# Patient Record
Sex: FEMALE | Race: BLACK OR AFRICAN AMERICAN | Employment: OTHER | URBAN - METROPOLITAN AREA
[De-identification: names, ages, dates, MRNs, and addresses within clinical notes are randomized per-mention and may not be internally consistent; named-entity substitution may affect disease eponyms.]

---

## 2018-07-04 ENCOUNTER — APPOINTMENT (OUTPATIENT)
Dept: GENERAL RADIOLOGY | Age: 73
End: 2018-07-04
Payer: MEDICARE

## 2018-07-04 ENCOUNTER — HOSPITAL ENCOUNTER (EMERGENCY)
Age: 73
Discharge: HOME | End: 2018-07-04
Attending: EMERGENCY MEDICINE
Payer: MEDICARE

## 2018-07-04 VITALS
HEART RATE: 62 BPM | BODY MASS INDEX: 37.32 KG/M2 | TEMPERATURE: 98.3 F | HEIGHT: 64 IN | RESPIRATION RATE: 16 BRPM | OXYGEN SATURATION: 97 % | DIASTOLIC BLOOD PRESSURE: 52 MMHG | WEIGHT: 218.6 LBS | SYSTOLIC BLOOD PRESSURE: 132 MMHG

## 2018-07-04 DIAGNOSIS — M10.00 ACUTE IDIOPATHIC GOUT, UNSPECIFIED SITE: Primary | ICD-10-CM

## 2018-07-04 LAB
ABSOLUTE EOS #: 0.3 K/UL (ref 0–0.4)
ABSOLUTE IMMATURE GRANULOCYTE: ABNORMAL K/UL (ref 0–0.3)
ABSOLUTE LYMPH #: 1 K/UL (ref 1–4.8)
ABSOLUTE MONO #: 0.5 K/UL (ref 0.2–0.8)
BASOPHILS # BLD: 0 % (ref 0–2)
BASOPHILS ABSOLUTE: 0 K/UL (ref 0–0.2)
DIFFERENTIAL TYPE: ABNORMAL
EOSINOPHILS RELATIVE PERCENT: 5 % (ref 1–4)
HCT VFR BLD CALC: 28.8 % (ref 36–46)
HEMOGLOBIN: 9 G/DL (ref 12–16)
IMMATURE GRANULOCYTES: ABNORMAL %
LYMPHOCYTES # BLD: 16 % (ref 24–44)
MCH RBC QN AUTO: 26.5 PG (ref 26–34)
MCHC RBC AUTO-ENTMCNC: 31.1 G/DL (ref 31–37)
MCV RBC AUTO: 85.2 FL (ref 80–100)
MONOCYTES # BLD: 9 % (ref 1–7)
NRBC AUTOMATED: ABNORMAL PER 100 WBC
PDW BLD-RTO: 17.2 % (ref 11.5–14.5)
PLATELET # BLD: 242 K/UL (ref 130–400)
PLATELET ESTIMATE: ABNORMAL
PMV BLD AUTO: 9.1 FL (ref 6–12)
RBC # BLD: 3.38 M/UL (ref 4–5.2)
RBC # BLD: ABNORMAL 10*6/UL
SEG NEUTROPHILS: 70 % (ref 36–66)
SEGMENTED NEUTROPHILS ABSOLUTE COUNT: 4.5 K/UL (ref 1.8–7.7)
URIC ACID: 6.4 MG/DL (ref 2.4–5.7)
WBC # BLD: 6.3 K/UL (ref 3.5–11)
WBC # BLD: ABNORMAL 10*3/UL

## 2018-07-04 PROCEDURE — 84550 ASSAY OF BLOOD/URIC ACID: CPT

## 2018-07-04 PROCEDURE — 73130 X-RAY EXAM OF HAND: CPT

## 2018-07-04 PROCEDURE — 73110 X-RAY EXAM OF WRIST: CPT

## 2018-07-04 PROCEDURE — 6360000002 HC RX W HCPCS: Performed by: EMERGENCY MEDICINE

## 2018-07-04 PROCEDURE — 85025 COMPLETE CBC W/AUTO DIFF WBC: CPT

## 2018-07-04 PROCEDURE — 96372 THER/PROPH/DIAG INJ SC/IM: CPT

## 2018-07-04 PROCEDURE — 99283 EMERGENCY DEPT VISIT LOW MDM: CPT

## 2018-07-04 RX ORDER — CARVEDILOL 25 MG/1
25 TABLET ORAL 2 TIMES DAILY WITH MEALS
COMMUNITY

## 2018-07-04 RX ORDER — FEXOFENADINE HCL 180 MG/1
180 TABLET ORAL DAILY
Status: ON HOLD | COMMUNITY
End: 2020-12-29 | Stop reason: ALTCHOICE

## 2018-07-04 RX ORDER — POTASSIUM CHLORIDE 20 MEQ/1
20 TABLET, EXTENDED RELEASE ORAL DAILY
COMMUNITY

## 2018-07-04 RX ORDER — FLUTICASONE PROPIONATE 50 MCG
1 SPRAY, SUSPENSION (ML) NASAL 2 TIMES DAILY
COMMUNITY

## 2018-07-04 RX ORDER — COLCHICINE 0.6 MG/1
TABLET ORAL
Qty: 10 TABLET | Refills: 0 | Status: SHIPPED | OUTPATIENT
Start: 2018-07-04 | End: 2020-12-28

## 2018-07-04 RX ORDER — ISOSORBIDE DINITRATE 20 MG/1
20 TABLET ORAL 3 TIMES DAILY
COMMUNITY

## 2018-07-04 RX ORDER — AMLODIPINE BESYLATE 10 MG/1
10 TABLET ORAL DAILY
COMMUNITY
Start: 2018-05-11

## 2018-07-04 RX ORDER — HYDRALAZINE HYDROCHLORIDE 50 MG/1
50 TABLET, FILM COATED ORAL 3 TIMES DAILY
COMMUNITY
Start: 2017-09-01

## 2018-07-04 RX ORDER — ATORVASTATIN CALCIUM 10 MG/1
10 TABLET, FILM COATED ORAL NIGHTLY
Status: ON HOLD | COMMUNITY
End: 2020-12-29

## 2018-07-04 RX ORDER — GUAIFENESIN 600 MG/1
1200 TABLET, EXTENDED RELEASE ORAL PRN
Status: ON HOLD | COMMUNITY
End: 2020-12-29

## 2018-07-04 RX ORDER — DEXAMETHASONE SODIUM PHOSPHATE 4 MG/ML
4 INJECTION, SOLUTION INTRA-ARTICULAR; INTRALESIONAL; INTRAMUSCULAR; INTRAVENOUS; SOFT TISSUE ONCE
Status: COMPLETED | OUTPATIENT
Start: 2018-07-04 | End: 2018-07-04

## 2018-07-04 RX ORDER — PROBENECID 500 MG/1
500 TABLET, FILM COATED ORAL 2 TIMES DAILY
COMMUNITY

## 2018-07-04 RX ORDER — FERROUS SULFATE 325(65) MG
325 TABLET ORAL 2 TIMES DAILY WITH MEALS
COMMUNITY
Start: 2018-01-25

## 2018-07-04 RX ORDER — ALBUTEROL SULFATE 90 UG/1
2 AEROSOL, METERED RESPIRATORY (INHALATION) EVERY 4 HOURS PRN
COMMUNITY

## 2018-07-04 RX ORDER — OMEPRAZOLE 20 MG/1
40 TABLET, DELAYED RELEASE ORAL 2 TIMES DAILY
Status: ON HOLD | COMMUNITY
End: 2020-12-29 | Stop reason: ALTCHOICE

## 2018-07-04 RX ORDER — LANOLIN ALCOHOL/MO/W.PET/CERES
400 CREAM (GRAM) TOPICAL DAILY
COMMUNITY
Start: 2018-01-21

## 2018-07-04 RX ORDER — FOLIC ACID 1 MG/1
1 TABLET ORAL DAILY
COMMUNITY

## 2018-07-04 RX ORDER — FUROSEMIDE 40 MG/1
40 TABLET ORAL DAILY
Status: ON HOLD | COMMUNITY
End: 2020-12-29 | Stop reason: ALTCHOICE

## 2018-07-04 RX ORDER — MEXILETINE HYDROCHLORIDE 150 MG/1
150 CAPSULE ORAL 3 TIMES DAILY
COMMUNITY
Start: 2017-10-04

## 2018-07-04 RX ORDER — ALENDRONATE SODIUM 70 MG/1
70 TABLET ORAL
Status: ON HOLD | COMMUNITY
End: 2020-12-29

## 2018-07-04 RX ORDER — KETOROLAC TROMETHAMINE 15 MG/ML
15 INJECTION, SOLUTION INTRAMUSCULAR; INTRAVENOUS ONCE
Status: COMPLETED | OUTPATIENT
Start: 2018-07-04 | End: 2018-07-04

## 2018-07-04 RX ORDER — CARVEDILOL 25 MG/1
25 TABLET ORAL 2 TIMES DAILY
Status: ON HOLD | COMMUNITY
End: 2020-12-29 | Stop reason: SDUPTHER

## 2018-07-04 RX ADMIN — KETOROLAC TROMETHAMINE 15 MG: 15 INJECTION, SOLUTION INTRAMUSCULAR; INTRAVENOUS at 20:43

## 2018-07-04 RX ADMIN — DEXAMETHASONE SODIUM PHOSPHATE 4 MG: 4 INJECTION, SOLUTION INTRAMUSCULAR; INTRAVENOUS at 21:20

## 2018-07-04 ASSESSMENT — PAIN SCALES - GENERAL
PAINLEVEL_OUTOF10: 9
PAINLEVEL_OUTOF10: 8
PAINLEVEL_OUTOF10: 9

## 2018-07-04 ASSESSMENT — PAIN DESCRIPTION - ORIENTATION: ORIENTATION: LEFT

## 2018-07-04 ASSESSMENT — PAIN DESCRIPTION - LOCATION: LOCATION: HAND

## 2018-07-04 ASSESSMENT — PAIN DESCRIPTION - FREQUENCY: FREQUENCY: CONTINUOUS

## 2018-07-04 ASSESSMENT — ENCOUNTER SYMPTOMS
RESPIRATORY NEGATIVE: 1
EYES NEGATIVE: 1
ALLERGIC/IMMUNOLOGIC NEGATIVE: 1

## 2018-07-04 ASSESSMENT — PAIN DESCRIPTION - PAIN TYPE: TYPE: ACUTE PAIN

## 2018-07-05 NOTE — ED PROVIDER NOTES
Ultrasound and MRI are read by the radiologist. Plain radiographic images are visualized and preliminarily interpreted by the emergency physician with the below findings:  Xr Wrist Left (min 3 Views)    Result Date: 7/5/2018  EXAMINATION: FOUR XRAY VIEWS OF THE LEFT WRIST; 3 XRAY VIEWS OF THE LEFT HAND 7/4/2018 8:41 pm COMPARISON: Left wrist dated 01/08/2006. HISTORY: ORDERING SYSTEM PROVIDED HISTORY: Pain TECHNOLOGIST PROVIDED HISTORY: Reason for exam:->Pain Ordering Physician Provided Reason for Exam: Lt wrist pain, NKI Acuity: Acute Type of Exam: Initial FINDINGS: Left wrist: The bones are demineralized. There is advanced radiocarpal joint space narrowing, progressed since the prior exam.  Scapholunate widening is present with proximal migration of the capitate. There is mature ossification at the radial styloid. Chondrocalcinosis is noted. There is mild distal radioulnar joint degenerative change. Marginal osteophytes are noted at the triscaphe and 1st carpometacarpal articulation. An acute osseous abnormality is not appreciated. Left hand: There is no evidence of acute fracture or dislocation. Small mature ossifications are noted at the 2nd MCP joint. Dorsal soft tissue swelling is noted. 1. Osteopenia. No displaced fracture. 2. Dorsal soft tissue swelling. 3. Left SLAC wrist deformity with advanced radiocarpal joint space narrowing, significantly progressed since the prior exam. 4. Mild to moderate degenerative change at the thumb base. Xr Hand Left (min 3 Views)    Result Date: 7/5/2018  EXAMINATION: FOUR XRAY VIEWS OF THE LEFT WRIST; 3 XRAY VIEWS OF THE LEFT HAND 7/4/2018 8:41 pm COMPARISON: Left wrist dated 01/08/2006. HISTORY: ORDERING SYSTEM PROVIDED HISTORY: Pain TECHNOLOGIST PROVIDED HISTORY: Reason for exam:->Pain Ordering Physician Provided Reason for Exam: Lt wrist pain, NKI Acuity: Acute Type of Exam: Initial FINDINGS: Left wrist: The bones are demineralized.   There is advanced radiocarpal joint space narrowing, progressed since the prior exam.  Scapholunate widening is present with proximal migration of the capitate. There is mature ossification at the radial styloid. Chondrocalcinosis is noted. There is mild distal radioulnar joint degenerative change. Marginal osteophytes are noted at the triscaphe and 1st carpometacarpal articulation. An acute osseous abnormality is not appreciated. Left hand: There is no evidence of acute fracture or dislocation. Small mature ossifications are noted at the 2nd MCP joint. Dorsal soft tissue swelling is noted. 1. Osteopenia. No displaced fracture. 2. Dorsal soft tissue swelling. 3. Left SLAC wrist deformity with advanced radiocarpal joint space narrowing, significantly progressed since the prior exam. 4. Mild to moderate degenerative change at the thumb base. (Please note that portions of this note were completed with a voice recognition program.  Efforts were made to edit the dictations but occasionally words are mis-transcribed.)    Samir Jain MD  Attending Emergency Physician            ED BEDSIDE ULTRASOUND:   Performed by ED Physician - none    LABS:  Labs Reviewed   CBC WITH AUTO DIFFERENTIAL - Abnormal; Notable for the following:        Result Value    RBC 3.38 (*)     Hemoglobin 9.0 (*)     Hematocrit 28.8 (*)     RDW 17.2 (*)     Seg Neutrophils 70 (*)     Lymphocytes 16 (*)     Monocytes 9 (*)     Eosinophils % 5 (*)     All other components within normal limits   URIC ACID - Abnormal; Notable for the following:     Uric Acid 6.4 (*)     All other components within normal limits       All other labs were within normal range or not returned as of this dictation.     EMERGENCY DEPARTMENT COURSE and DIFFERENTIAL DIAGNOSIS/MDM:   Vitals:    Vitals:    07/04/18 1925   BP: (!) 132/52   Pulse: 62   Resp: 16   Temp: 98.3 °F (36.8 °C)   TempSrc: Oral   SpO2: 97%   Weight: 218 lb 9.6 oz (99.2 kg)   Height: 5' 4\" (1.626 m)

## 2018-07-05 NOTE — ED NOTES
Pt presents to the ed via private auto c/o left hand pain that started about 3 days ago after doing yard work. Pt denies fall or injury, skin is warm to touch, she denies any numbness or tingling, radial pulses equal and strong, cap refill brisk, edema noted to left hand.      Alyssa Moore RN  07/04/18 2007

## 2019-09-07 ENCOUNTER — HOSPITAL ENCOUNTER (OUTPATIENT)
Dept: ONCOLOGY | Age: 74
Discharge: HOME | End: 2019-09-07
Attending: INTERNAL MEDICINE | Admitting: INTERNAL MEDICINE
Payer: MEDICARE

## 2019-09-07 VITALS
TEMPERATURE: 97 F | RESPIRATION RATE: 18 BRPM | HEART RATE: 65 BPM | DIASTOLIC BLOOD PRESSURE: 66 MMHG | SYSTOLIC BLOOD PRESSURE: 158 MMHG | OXYGEN SATURATION: 94 %

## 2019-09-07 PROBLEM — D64.9 ANEMIA: Status: ACTIVE | Noted: 2019-09-07

## 2019-09-07 LAB — BLOOD BANK SPECIMEN: NORMAL

## 2019-09-07 PROCEDURE — 6360000002 HC RX W HCPCS: Performed by: INTERNAL MEDICINE

## 2019-09-07 PROCEDURE — 86850 RBC ANTIBODY SCREEN: CPT

## 2019-09-07 PROCEDURE — 96374 THER/PROPH/DIAG INJ IV PUSH: CPT

## 2019-09-07 PROCEDURE — 86901 BLOOD TYPING SEROLOGIC RH(D): CPT

## 2019-09-07 PROCEDURE — 6370000000 HC RX 637 (ALT 250 FOR IP): Performed by: INTERNAL MEDICINE

## 2019-09-07 PROCEDURE — 86900 BLOOD TYPING SEROLOGIC ABO: CPT

## 2019-09-07 PROCEDURE — 36430 TRANSFUSION BLD/BLD COMPNT: CPT

## 2019-09-07 PROCEDURE — P9016 RBC LEUKOCYTES REDUCED: HCPCS

## 2019-09-07 PROCEDURE — 86920 COMPATIBILITY TEST SPIN: CPT

## 2019-09-07 RX ORDER — ACETAMINOPHEN 325 MG/1
650 TABLET ORAL ONCE
Status: COMPLETED | OUTPATIENT
Start: 2019-09-07 | End: 2019-09-07

## 2019-09-07 RX ORDER — FUROSEMIDE 10 MG/ML
20 INJECTION INTRAMUSCULAR; INTRAVENOUS ONCE
Status: COMPLETED | OUTPATIENT
Start: 2019-09-07 | End: 2019-09-07

## 2019-09-07 RX ORDER — DIPHENHYDRAMINE HCL 25 MG
25 TABLET ORAL ONCE
Status: COMPLETED | OUTPATIENT
Start: 2019-09-07 | End: 2019-09-07

## 2019-09-07 RX ADMIN — DIPHENHYDRAMINE HCL 25 MG: 25 TABLET ORAL at 09:36

## 2019-09-07 RX ADMIN — ACETAMINOPHEN 650 MG: 325 TABLET ORAL at 09:36

## 2019-09-07 RX ADMIN — FUROSEMIDE 20 MG: 10 INJECTION, SOLUTION INTRAMUSCULAR; INTRAVENOUS at 12:12

## 2019-09-07 ASSESSMENT — PAIN SCALES - GENERAL: PAINLEVEL_OUTOF10: 0

## 2019-09-08 LAB
ABO/RH: NORMAL
ANTIBODY SCREEN: NEGATIVE
ARM BAND NUMBER: NORMAL
BLD PROD TYP BPU: NORMAL
BLD PROD TYP BPU: NORMAL
CROSSMATCH RESULT: NORMAL
CROSSMATCH RESULT: NORMAL
DISPENSE STATUS BLOOD BANK: NORMAL
DISPENSE STATUS BLOOD BANK: NORMAL
EXPIRATION DATE: NORMAL
TRANSFUSION STATUS: NORMAL
TRANSFUSION STATUS: NORMAL
UNIT DIVISION: 0
UNIT DIVISION: 0
UNIT NUMBER: NORMAL
UNIT NUMBER: NORMAL

## 2019-11-19 ENCOUNTER — HOSPITAL ENCOUNTER (OUTPATIENT)
Dept: ONCOLOGY | Age: 74
Discharge: HOME | End: 2019-11-19
Attending: INTERNAL MEDICINE | Admitting: INTERNAL MEDICINE
Payer: MEDICARE

## 2019-11-19 VITALS
RESPIRATION RATE: 17 BRPM | OXYGEN SATURATION: 98 % | HEART RATE: 60 BPM | TEMPERATURE: 97.3 F | DIASTOLIC BLOOD PRESSURE: 71 MMHG | SYSTOLIC BLOOD PRESSURE: 184 MMHG

## 2019-11-19 DIAGNOSIS — D50.8 OTHER IRON DEFICIENCY ANEMIA: ICD-10-CM

## 2019-11-19 LAB — BLOOD BANK SPECIMEN: NORMAL

## 2019-11-19 PROCEDURE — 6370000000 HC RX 637 (ALT 250 FOR IP): Performed by: INTERNAL MEDICINE

## 2019-11-19 PROCEDURE — 36430 TRANSFUSION BLD/BLD COMPNT: CPT

## 2019-11-19 PROCEDURE — P9016 RBC LEUKOCYTES REDUCED: HCPCS

## 2019-11-19 PROCEDURE — 86900 BLOOD TYPING SEROLOGIC ABO: CPT

## 2019-11-19 PROCEDURE — 86901 BLOOD TYPING SEROLOGIC RH(D): CPT

## 2019-11-19 PROCEDURE — 86850 RBC ANTIBODY SCREEN: CPT

## 2019-11-19 PROCEDURE — 86920 COMPATIBILITY TEST SPIN: CPT

## 2019-11-19 RX ORDER — DIPHENHYDRAMINE HCL 25 MG
25 TABLET ORAL ONCE
Status: COMPLETED | OUTPATIENT
Start: 2019-11-19 | End: 2019-11-19

## 2019-11-19 RX ORDER — FUROSEMIDE 10 MG/ML
20 INJECTION INTRAMUSCULAR; INTRAVENOUS ONCE
Status: DISCONTINUED | OUTPATIENT
Start: 2019-11-19 | End: 2019-11-19 | Stop reason: HOSPADM

## 2019-11-19 RX ORDER — ACETAMINOPHEN 325 MG/1
650 TABLET ORAL ONCE
Status: COMPLETED | OUTPATIENT
Start: 2019-11-19 | End: 2019-11-19

## 2019-11-19 RX ADMIN — ACETAMINOPHEN 650 MG: 325 TABLET ORAL at 14:51

## 2019-11-19 RX ADMIN — DIPHENHYDRAMINE HCL 25 MG: 25 TABLET ORAL at 14:52

## 2019-11-19 ASSESSMENT — PAIN SCALES - GENERAL: PAINLEVEL_OUTOF10: 0

## 2020-05-27 ENCOUNTER — HOSPITAL ENCOUNTER (OUTPATIENT)
Dept: ONCOLOGY | Age: 75
Discharge: HOME | End: 2020-05-27
Attending: INTERNAL MEDICINE | Admitting: INTERNAL MEDICINE
Payer: MEDICARE

## 2020-05-27 ENCOUNTER — HOSPITAL ENCOUNTER (OUTPATIENT)
Age: 75
Discharge: HOME | End: 2020-05-27
Payer: MEDICARE

## 2020-05-27 VITALS
SYSTOLIC BLOOD PRESSURE: 136 MMHG | DIASTOLIC BLOOD PRESSURE: 56 MMHG | OXYGEN SATURATION: 94 % | TEMPERATURE: 98.4 F | RESPIRATION RATE: 18 BRPM | HEART RATE: 60 BPM

## 2020-05-27 LAB
ABSOLUTE EOS #: 0.17 K/UL (ref 0–0.4)
ABSOLUTE IMMATURE GRANULOCYTE: 0 K/UL (ref 0–0.3)
ABSOLUTE LYMPH #: 0.61 K/UL (ref 1–4.8)
ABSOLUTE MONO #: 0.33 K/UL (ref 0.1–0.8)
ABSOLUTE RETIC #: 0.06 M/UL (ref 0.03–0.08)
ALBUMIN SERPL-MCNC: 3.1 G/DL (ref 3.5–5.2)
ALBUMIN/GLOBULIN RATIO: 1 (ref 1–2.5)
ALP BLD-CCNC: 52 U/L (ref 35–104)
ALT SERPL-CCNC: 10 U/L (ref 5–33)
ANION GAP SERPL CALCULATED.3IONS-SCNC: 13 MMOL/L (ref 9–17)
AST SERPL-CCNC: 15 U/L
BASOPHILS # BLD: 0 % (ref 0–2)
BASOPHILS ABSOLUTE: 0 K/UL (ref 0–0.2)
BILIRUB SERPL-MCNC: <0.1 MG/DL (ref 0.3–1.2)
BUN BLDV-MCNC: 9 MG/DL (ref 8–23)
BUN/CREAT BLD: ABNORMAL (ref 9–20)
CALCIUM SERPL-MCNC: 8.8 MG/DL (ref 8.6–10.4)
CHLORIDE BLD-SCNC: 110 MMOL/L (ref 98–107)
CO2: 22 MMOL/L (ref 20–31)
CREAT SERPL-MCNC: 1.08 MG/DL (ref 0.5–0.9)
DIFFERENTIAL TYPE: ABNORMAL
EOSINOPHILS RELATIVE PERCENT: 3 % (ref 1–4)
FERRITIN: 47 UG/L (ref 13–150)
FOLATE: >20 NG/ML
GFR AFRICAN AMERICAN: 60 ML/MIN
GFR NON-AFRICAN AMERICAN: 49 ML/MIN
GFR SERPL CREATININE-BSD FRML MDRD: ABNORMAL ML/MIN/{1.73_M2}
GFR SERPL CREATININE-BSD FRML MDRD: ABNORMAL ML/MIN/{1.73_M2}
GLUCOSE BLD-MCNC: 179 MG/DL (ref 70–99)
HAPTOGLOBIN: 154 MG/DL (ref 30–200)
HCT VFR BLD CALC: 24 % (ref 36.3–47.1)
HEMOGLOBIN: 6.8 G/DL (ref 11.9–15.1)
IMMATURE GRANULOCYTES: 0 %
IMMATURE RETIC FRACT: 24.9 % (ref 2.7–18.3)
IRON SATURATION: 33 % (ref 20–55)
IRON: 81 UG/DL (ref 37–145)
LACTATE DEHYDROGENASE: 229 U/L (ref 135–214)
LYMPHOCYTES # BLD: 11 % (ref 24–44)
MCH RBC QN AUTO: 24.9 PG (ref 25.2–33.5)
MCHC RBC AUTO-ENTMCNC: 28.3 G/DL (ref 28.4–34.8)
MCV RBC AUTO: 87.9 FL (ref 82.6–102.9)
MONOCYTES # BLD: 6 % (ref 1–7)
MORPHOLOGY: ABNORMAL
NRBC AUTOMATED: 0 PER 100 WBC
PDW BLD-RTO: 21.2 % (ref 11.8–14.4)
PLATELET # BLD: 371 K/UL (ref 138–453)
PLATELET ESTIMATE: ABNORMAL
PMV BLD AUTO: 11.4 FL (ref 8.1–13.5)
POTASSIUM SERPL-SCNC: 4.1 MMOL/L (ref 3.7–5.3)
RBC # BLD: 2.73 M/UL (ref 3.95–5.11)
RBC # BLD: ABNORMAL 10*6/UL
RETIC %: 2.3 % (ref 0.5–1.9)
RETIC HEMOGLOBIN: 23.3 PG (ref 28.2–35.7)
SEDIMENTATION RATE, ERYTHROCYTE: 27 MM (ref 0–20)
SEG NEUTROPHILS: 80 % (ref 36–66)
SEGMENTED NEUTROPHILS ABSOLUTE COUNT: 4.39 K/UL (ref 1.8–7.7)
SODIUM BLD-SCNC: 145 MMOL/L (ref 135–144)
TOTAL IRON BINDING CAPACITY: 243 UG/DL (ref 250–450)
TOTAL PROTEIN: 6.2 G/DL (ref 6.4–8.3)
TSH SERPL DL<=0.05 MIU/L-ACNC: 0.69 MIU/L (ref 0.3–5)
UNSATURATED IRON BINDING CAPACITY: 162 UG/DL (ref 112–347)
VITAMIN B-12: 956 PG/ML (ref 232–1245)
WBC # BLD: 5.5 K/UL (ref 3.5–11.3)
WBC # BLD: ABNORMAL 10*3/UL

## 2020-05-27 PROCEDURE — 82746 ASSAY OF FOLIC ACID SERUM: CPT

## 2020-05-27 PROCEDURE — 86901 BLOOD TYPING SEROLOGIC RH(D): CPT

## 2020-05-27 PROCEDURE — 86038 ANTINUCLEAR ANTIBODIES: CPT

## 2020-05-27 PROCEDURE — 84155 ASSAY OF PROTEIN SERUM: CPT

## 2020-05-27 PROCEDURE — 82668 ASSAY OF ERYTHROPOIETIN: CPT

## 2020-05-27 PROCEDURE — 83540 ASSAY OF IRON: CPT

## 2020-05-27 PROCEDURE — 80053 COMPREHEN METABOLIC PANEL: CPT

## 2020-05-27 PROCEDURE — 36430 TRANSFUSION BLD/BLD COMPNT: CPT

## 2020-05-27 PROCEDURE — 82607 VITAMIN B-12: CPT

## 2020-05-27 PROCEDURE — 86900 BLOOD TYPING SEROLOGIC ABO: CPT

## 2020-05-27 PROCEDURE — 83010 ASSAY OF HAPTOGLOBIN QUANT: CPT

## 2020-05-27 PROCEDURE — 85025 COMPLETE CBC W/AUTO DIFF WBC: CPT

## 2020-05-27 PROCEDURE — 6370000000 HC RX 637 (ALT 250 FOR IP): Performed by: INTERNAL MEDICINE

## 2020-05-27 PROCEDURE — 86334 IMMUNOFIX E-PHORESIS SERUM: CPT

## 2020-05-27 PROCEDURE — 84165 PROTEIN E-PHORESIS SERUM: CPT

## 2020-05-27 PROCEDURE — P9040 RBC LEUKOREDUCED IRRADIATED: HCPCS

## 2020-05-27 PROCEDURE — 85651 RBC SED RATE NONAUTOMATED: CPT

## 2020-05-27 PROCEDURE — 83550 IRON BINDING TEST: CPT

## 2020-05-27 PROCEDURE — 86920 COMPATIBILITY TEST SPIN: CPT

## 2020-05-27 PROCEDURE — 82728 ASSAY OF FERRITIN: CPT

## 2020-05-27 PROCEDURE — 86850 RBC ANTIBODY SCREEN: CPT

## 2020-05-27 PROCEDURE — 36415 COLL VENOUS BLD VENIPUNCTURE: CPT

## 2020-05-27 PROCEDURE — 85045 AUTOMATED RETICULOCYTE COUNT: CPT

## 2020-05-27 PROCEDURE — 83615 LACTATE (LD) (LDH) ENZYME: CPT

## 2020-05-27 PROCEDURE — 84443 ASSAY THYROID STIM HORMONE: CPT

## 2020-05-27 PROCEDURE — P9016 RBC LEUKOCYTES REDUCED: HCPCS

## 2020-05-27 PROCEDURE — 76937 US GUIDE VASCULAR ACCESS: CPT

## 2020-05-27 RX ORDER — ACETAMINOPHEN 325 MG/1
650 TABLET ORAL ONCE
Status: COMPLETED | OUTPATIENT
Start: 2020-05-27 | End: 2020-05-27

## 2020-05-27 RX ORDER — DIPHENHYDRAMINE HCL 25 MG
25 TABLET ORAL ONCE
Status: COMPLETED | OUTPATIENT
Start: 2020-05-27 | End: 2020-05-27

## 2020-05-27 RX ORDER — SODIUM CHLORIDE 0.9 % (FLUSH) 0.9 %
10 SYRINGE (ML) INJECTION PRN
Status: DISCONTINUED | OUTPATIENT
Start: 2020-05-27 | End: 2020-05-27 | Stop reason: HOSPADM

## 2020-05-27 RX ORDER — FUROSEMIDE 10 MG/ML
20 INJECTION INTRAMUSCULAR; INTRAVENOUS ONCE
Status: DISCONTINUED | OUTPATIENT
Start: 2020-05-27 | End: 2020-05-27 | Stop reason: HOSPADM

## 2020-05-27 RX ADMIN — ACETAMINOPHEN 650 MG: 325 TABLET ORAL at 14:23

## 2020-05-27 RX ADMIN — DIPHENHYDRAMINE HCL 25 MG: 25 TABLET ORAL at 14:23

## 2020-05-27 ASSESSMENT — PAIN SCALES - GENERAL: PAINLEVEL_OUTOF10: 0

## 2020-05-28 LAB
ABO/RH: NORMAL
ANTI-NUCLEAR ANTIBODY (ANA): NEGATIVE
ANTIBODY SCREEN: NEGATIVE
ARM BAND NUMBER: NORMAL
BLD PROD TYP BPU: NORMAL
BLD PROD TYP BPU: NORMAL
CROSSMATCH RESULT: NORMAL
CROSSMATCH RESULT: NORMAL
DISPENSE STATUS BLOOD BANK: NORMAL
DISPENSE STATUS BLOOD BANK: NORMAL
ERYTHROPOIETIN: 240 MU/ML (ref 4–27)
EXPIRATION DATE: NORMAL
SURGICAL PATHOLOGY REPORT: NORMAL
TRANSFUSION STATUS: NORMAL
TRANSFUSION STATUS: NORMAL
UNIT DIVISION: 0
UNIT DIVISION: 0
UNIT NUMBER: NORMAL
UNIT NUMBER: NORMAL

## 2020-05-29 LAB — PATHOLOGIST REVIEW: NORMAL

## 2020-06-01 LAB
ALBUMIN (CALCULATED): 3.6 G/DL (ref 3.2–5.2)
ALBUMIN PERCENT: 58 % (ref 45–65)
ALPHA 1 PERCENT: 4 % (ref 3–6)
ALPHA 2 PERCENT: 11 % (ref 6–13)
ALPHA-1-GLOBULIN: 0.2 G/DL (ref 0.1–0.4)
ALPHA-2-GLOBULIN: 0.7 G/DL (ref 0.5–0.9)
BETA GLOBULIN: 0.6 G/DL (ref 0.5–1.1)
BETA PERCENT: 10 % (ref 11–19)
GAMMA GLOBULIN %: 17 % (ref 9–20)
GAMMA GLOBULIN: 1.1 G/DL (ref 0.5–1.5)
PATHOLOGIST: ABNORMAL
PATHOLOGIST: NORMAL
PROTEIN ELECTROPHORESIS, SERUM: ABNORMAL
SERUM IFX INTERP: NORMAL
TOTAL PROT. SUM,%: 100 % (ref 98–102)
TOTAL PROT. SUM: 6.2 G/DL (ref 6.3–8.2)
TOTAL PROTEIN: 6.2 G/DL (ref 6.4–8.3)

## 2020-12-28 ENCOUNTER — APPOINTMENT (OUTPATIENT)
Dept: NUCLEAR MEDICINE | Age: 75
DRG: 177 | End: 2020-12-28
Payer: MEDICARE

## 2020-12-28 ENCOUNTER — APPOINTMENT (OUTPATIENT)
Dept: GENERAL RADIOLOGY | Age: 75
DRG: 177 | End: 2020-12-28
Payer: MEDICARE

## 2020-12-28 ENCOUNTER — APPOINTMENT (OUTPATIENT)
Dept: CT IMAGING | Age: 75
DRG: 177 | End: 2020-12-28
Payer: MEDICARE

## 2020-12-28 ENCOUNTER — HOSPITAL ENCOUNTER (INPATIENT)
Age: 75
LOS: 3 days | Discharge: HOME | DRG: 177 | End: 2020-12-31
Attending: EMERGENCY MEDICINE | Admitting: INTERNAL MEDICINE
Payer: MEDICARE

## 2020-12-28 DIAGNOSIS — D64.9 ANEMIA, UNSPECIFIED TYPE: ICD-10-CM

## 2020-12-28 DIAGNOSIS — N17.9 ACUTE KIDNEY INJURY (HCC): ICD-10-CM

## 2020-12-28 DIAGNOSIS — U07.1 COVID-19: Primary | ICD-10-CM

## 2020-12-28 DIAGNOSIS — R09.02 HYPOXIA: ICD-10-CM

## 2020-12-28 PROBLEM — J44.9 COPD (CHRONIC OBSTRUCTIVE PULMONARY DISEASE) (HCC): Status: ACTIVE | Noted: 2017-08-09

## 2020-12-28 PROBLEM — Z86.74 HISTORY OF CARDIAC ARREST: Status: ACTIVE | Noted: 2020-05-06

## 2020-12-28 PROBLEM — I48.0 PAROXYSMAL ATRIAL FIBRILLATION (HCC): Status: ACTIVE | Noted: 2020-06-16

## 2020-12-28 PROBLEM — M10.9 GOUT: Status: ACTIVE | Noted: 2018-01-09

## 2020-12-28 PROBLEM — F02.80 ALZHEIMER'S DISEASE (HCC): Status: ACTIVE | Noted: 2020-05-06

## 2020-12-28 PROBLEM — E78.00 HYPERCHOLESTEROLEMIA: Status: ACTIVE | Noted: 2018-01-09

## 2020-12-28 PROBLEM — J12.82 PNEUMONIA DUE TO COVID-19 VIRUS: Status: ACTIVE | Noted: 2020-12-28

## 2020-12-28 PROBLEM — Z95.0 CARDIAC PACEMAKER IN SITU: Status: ACTIVE | Noted: 2020-05-06

## 2020-12-28 PROBLEM — J45.909 ASTHMA: Status: ACTIVE | Noted: 2018-01-09

## 2020-12-28 PROBLEM — G30.9 ALZHEIMER'S DISEASE (HCC): Status: ACTIVE | Noted: 2020-05-06

## 2020-12-28 PROBLEM — I10 BENIGN HYPERTENSION: Status: ACTIVE | Noted: 2018-01-09

## 2020-12-28 PROBLEM — Z79.01 LONG TERM CURRENT USE OF ANTICOAGULANT THERAPY: Status: ACTIVE | Noted: 2020-05-06

## 2020-12-28 LAB
ABSOLUTE EOS #: 0 K/UL (ref 0–0.44)
ABSOLUTE EOS #: 0 K/UL (ref 0–0.44)
ABSOLUTE IMMATURE GRANULOCYTE: 0.06 K/UL (ref 0–0.3)
ABSOLUTE IMMATURE GRANULOCYTE: 0.06 K/UL (ref 0–0.3)
ABSOLUTE LYMPH #: 0.39 K/UL (ref 1.1–3.7)
ABSOLUTE LYMPH #: 0.64 K/UL (ref 1.1–3.7)
ABSOLUTE MONO #: 0.17 K/UL (ref 0.1–1.2)
ABSOLUTE MONO #: 0.41 K/UL (ref 0.1–1.2)
ALBUMIN SERPL-MCNC: 3 G/DL (ref 3.5–5.2)
ALBUMIN/GLOBULIN RATIO: ABNORMAL (ref 1–2.5)
ALP BLD-CCNC: 63 U/L (ref 35–104)
ALT SERPL-CCNC: 52 U/L (ref 5–33)
ANION GAP SERPL CALCULATED.3IONS-SCNC: 13 MMOL/L (ref 9–17)
ANION GAP SERPL CALCULATED.3IONS-SCNC: 9 MMOL/L (ref 9–17)
AST SERPL-CCNC: 82 U/L
BASOPHILS # BLD: 0 % (ref 0–2)
BASOPHILS # BLD: 0 % (ref 0–2)
BASOPHILS ABSOLUTE: 0 K/UL (ref 0–0.2)
BASOPHILS ABSOLUTE: 0 K/UL (ref 0–0.2)
BILIRUB SERPL-MCNC: 0.44 MG/DL (ref 0.3–1.2)
BNP INTERPRETATION: ABNORMAL
BUN BLDV-MCNC: 25 MG/DL (ref 8–23)
BUN BLDV-MCNC: 29 MG/DL (ref 8–23)
BUN/CREAT BLD: 17 (ref 9–20)
BUN/CREAT BLD: 20 (ref 9–20)
CALCIUM SERPL-MCNC: 7.7 MG/DL (ref 8.6–10.4)
CALCIUM SERPL-MCNC: 8.1 MG/DL (ref 8.6–10.4)
CHLORIDE BLD-SCNC: 105 MMOL/L (ref 98–107)
CHLORIDE BLD-SCNC: 108 MMOL/L (ref 98–107)
CHP ED QC CHECK: NORMAL
CO2: 22 MMOL/L (ref 20–31)
CO2: 23 MMOL/L (ref 20–31)
CREAT SERPL-MCNC: 1.26 MG/DL (ref 0.5–0.9)
CREAT SERPL-MCNC: 1.72 MG/DL (ref 0.5–0.9)
D-DIMER QUANTITATIVE: 3.34 MG/L FEU (ref 0–0.59)
D-DIMER QUANTITATIVE: 3.64 MG/L FEU (ref 0–0.59)
DIFFERENTIAL TYPE: ABNORMAL
DIFFERENTIAL TYPE: ABNORMAL
EOSINOPHILS RELATIVE PERCENT: 0 % (ref 1–4)
EOSINOPHILS RELATIVE PERCENT: 0 % (ref 1–4)
FERRITIN: 153 UG/L (ref 13–150)
FIBRINOGEN: 495 MG/DL (ref 179–518)
FIO2: 28
FREE KAPPA/LAMBDA RATIO: 1.42 (ref 0.26–1.65)
GFR AFRICAN AMERICAN: 35 ML/MIN
GFR AFRICAN AMERICAN: 50 ML/MIN
GFR NON-AFRICAN AMERICAN: 29 ML/MIN
GFR NON-AFRICAN AMERICAN: 41 ML/MIN
GFR SERPL CREATININE-BSD FRML MDRD: ABNORMAL ML/MIN/{1.73_M2}
GLUCOSE BLD-MCNC: 148 MG/DL
GLUCOSE BLD-MCNC: 148 MG/DL (ref 65–105)
GLUCOSE BLD-MCNC: 157 MG/DL (ref 70–99)
GLUCOSE BLD-MCNC: 167 MG/DL (ref 65–105)
GLUCOSE BLD-MCNC: 241 MG/DL (ref 70–99)
HCT VFR BLD CALC: 20.5 % (ref 36.3–47.1)
HCT VFR BLD CALC: 24.2 % (ref 36.3–47.1)
HEMOGLOBIN: 6 G/DL (ref 11.9–15.1)
HEMOGLOBIN: 7.3 G/DL (ref 11.9–15.1)
IMMATURE GRANULOCYTES: 1 %
IMMATURE GRANULOCYTES: 1 %
KAPPA FREE LIGHT CHAINS QNT: 8.54 MG/DL (ref 0.37–1.94)
LACTATE DEHYDROGENASE: 434 U/L (ref 135–214)
LACTIC ACID, SEPSIS WHOLE BLOOD: NORMAL MMOL/L (ref 0.5–1.9)
LACTIC ACID, SEPSIS WHOLE BLOOD: NORMAL MMOL/L (ref 0.5–1.9)
LACTIC ACID, SEPSIS: 1.2 MMOL/L (ref 0.5–1.9)
LACTIC ACID, SEPSIS: 1.2 MMOL/L (ref 0.5–1.9)
LACTIC ACID: 4.6 MMOL/L (ref 0.5–2.2)
LAMBDA FREE LIGHT CHAINS QNT: 6.02 MG/DL (ref 0.57–2.63)
LYMPHOCYTES # BLD: 11 % (ref 24–43)
LYMPHOCYTES # BLD: 7 % (ref 24–43)
MCH RBC QN AUTO: 26.4 PG (ref 25.2–33.5)
MCH RBC QN AUTO: 27.2 PG (ref 25.2–33.5)
MCHC RBC AUTO-ENTMCNC: 29.3 G/DL (ref 28.4–34.8)
MCHC RBC AUTO-ENTMCNC: 30.2 G/DL (ref 28.4–34.8)
MCV RBC AUTO: 90.3 FL (ref 82.6–102.9)
MCV RBC AUTO: 90.3 FL (ref 82.6–102.9)
MONOCYTES # BLD: 3 % (ref 3–12)
MONOCYTES # BLD: 7 % (ref 3–12)
MORPHOLOGY: ABNORMAL
NEGATIVE BASE EXCESS, ART: 1 (ref 0–2)
NRBC AUTOMATED: 2 PER 100 WBC
NRBC AUTOMATED: 2.4 PER 100 WBC
O2 DEVICE/FLOW/%: ABNORMAL
PATIENT TEMP: 37
PDW BLD-RTO: 16.9 % (ref 11.8–14.4)
PDW BLD-RTO: 17.8 % (ref 11.8–14.4)
PLATELET # BLD: 204 K/UL (ref 138–453)
PLATELET # BLD: 240 K/UL (ref 138–453)
PLATELET ESTIMATE: ABNORMAL
PLATELET ESTIMATE: ABNORMAL
PMV BLD AUTO: 11.3 FL (ref 8.1–13.5)
PMV BLD AUTO: 11.9 FL (ref 8.1–13.5)
POC HCO3: 22.7 MMOL/L (ref 22–27)
POC O2 SATURATION: 93 %
POC PCO2 TEMP: ABNORMAL MM HG
POC PCO2: 31 MM HG (ref 32–45)
POC PH TEMP: ABNORMAL
POC PH: 7.47 (ref 7.35–7.45)
POC PO2 TEMP: ABNORMAL MM HG
POC PO2: 62 MM HG (ref 75–95)
POSITIVE BASE EXCESS, ART: ABNORMAL (ref 0–2)
POTASSIUM SERPL-SCNC: 4.1 MMOL/L (ref 3.7–5.3)
POTASSIUM SERPL-SCNC: 4.1 MMOL/L (ref 3.7–5.3)
PRO-BNP: ABNORMAL PG/ML
PROCALCITONIN: 0.36 NG/ML
RBC # BLD: 2.27 M/UL (ref 3.95–5.11)
RBC # BLD: 2.68 M/UL (ref 3.95–5.11)
RBC # BLD: ABNORMAL 10*6/UL
RBC # BLD: ABNORMAL 10*6/UL
SARS-COV-2, RAPID: DETECTED
SARS-COV-2: ABNORMAL
SARS-COV-2: ABNORMAL
SEG NEUTROPHILS: 81 % (ref 36–65)
SEG NEUTROPHILS: 89 % (ref 36–65)
SEGMENTED NEUTROPHILS ABSOLUTE COUNT: 4.69 K/UL (ref 1.5–8.1)
SEGMENTED NEUTROPHILS ABSOLUTE COUNT: 4.88 K/UL (ref 1.5–8.1)
SODIUM BLD-SCNC: 139 MMOL/L (ref 135–144)
SODIUM BLD-SCNC: 141 MMOL/L (ref 135–144)
SOURCE: ABNORMAL
TCO2 (CALC), ART: 24 MMOL/L (ref 23–28)
TOTAL PROTEIN: 6.5 G/DL (ref 6.4–8.3)
WBC # BLD: 5.5 K/UL (ref 3.5–11.3)
WBC # BLD: 5.8 K/UL (ref 3.5–11.3)
WBC # BLD: ABNORMAL 10*3/UL
WBC # BLD: ABNORMAL 10*3/UL

## 2020-12-28 PROCEDURE — A9540 TC99M MAA: HCPCS | Performed by: NURSE PRACTITIONER

## 2020-12-28 PROCEDURE — 6360000002 HC RX W HCPCS: Performed by: NURSE PRACTITIONER

## 2020-12-28 PROCEDURE — 86334 IMMUNOFIX E-PHORESIS SERUM: CPT

## 2020-12-28 PROCEDURE — 84156 ASSAY OF PROTEIN URINE: CPT

## 2020-12-28 PROCEDURE — 80048 BASIC METABOLIC PNL TOTAL CA: CPT

## 2020-12-28 PROCEDURE — 6370000000 HC RX 637 (ALT 250 FOR IP): Performed by: NURSE PRACTITIONER

## 2020-12-28 PROCEDURE — 85384 FIBRINOGEN ACTIVITY: CPT

## 2020-12-28 PROCEDURE — 85025 COMPLETE CBC W/AUTO DIFF WBC: CPT

## 2020-12-28 PROCEDURE — 2580000003 HC RX 258: Performed by: NURSE PRACTITIONER

## 2020-12-28 PROCEDURE — 83880 ASSAY OF NATRIURETIC PEPTIDE: CPT

## 2020-12-28 PROCEDURE — 87040 BLOOD CULTURE FOR BACTERIA: CPT

## 2020-12-28 PROCEDURE — 83605 ASSAY OF LACTIC ACID: CPT

## 2020-12-28 PROCEDURE — 74176 CT ABD & PELVIS W/O CONTRAST: CPT

## 2020-12-28 PROCEDURE — 80053 COMPREHEN METABOLIC PANEL: CPT

## 2020-12-28 PROCEDURE — 3430000000 HC RX DIAGNOSTIC RADIOPHARMACEUTICAL: Performed by: NURSE PRACTITIONER

## 2020-12-28 PROCEDURE — 94761 N-INVAS EAR/PLS OXIMETRY MLT: CPT

## 2020-12-28 PROCEDURE — 84155 ASSAY OF PROTEIN SERUM: CPT

## 2020-12-28 PROCEDURE — 86920 COMPATIBILITY TEST SPIN: CPT

## 2020-12-28 PROCEDURE — 86160 COMPLEMENT ANTIGEN: CPT

## 2020-12-28 PROCEDURE — 83615 LACTATE (LD) (LDH) ENZYME: CPT

## 2020-12-28 PROCEDURE — U0002 COVID-19 LAB TEST NON-CDC: HCPCS

## 2020-12-28 PROCEDURE — 86901 BLOOD TYPING SEROLOGIC RH(D): CPT

## 2020-12-28 PROCEDURE — APPSS180 APP SPLIT SHARED TIME > 60 MINUTES: Performed by: NURSE PRACTITIONER

## 2020-12-28 PROCEDURE — 82728 ASSAY OF FERRITIN: CPT

## 2020-12-28 PROCEDURE — 82947 ASSAY GLUCOSE BLOOD QUANT: CPT

## 2020-12-28 PROCEDURE — 83883 ASSAY NEPHELOMETRY NOT SPEC: CPT

## 2020-12-28 PROCEDURE — 86900 BLOOD TYPING SEROLOGIC ABO: CPT

## 2020-12-28 PROCEDURE — 87205 SMEAR GRAM STAIN: CPT

## 2020-12-28 PROCEDURE — U0003 INFECTIOUS AGENT DETECTION BY NUCLEIC ACID (DNA OR RNA); SEVERE ACUTE RESPIRATORY SYNDROME CORONAVIRUS 2 (SARS-COV-2) (CORONAVIRUS DISEASE [COVID-19]), AMPLIFIED PROBE TECHNIQUE, MAKING USE OF HIGH THROUGHPUT TECHNOLOGIES AS DESCRIBED BY CMS-2020-01-R: HCPCS

## 2020-12-28 PROCEDURE — 84145 PROCALCITONIN (PCT): CPT

## 2020-12-28 PROCEDURE — 36600 WITHDRAWAL OF ARTERIAL BLOOD: CPT

## 2020-12-28 PROCEDURE — 87070 CULTURE OTHR SPECIMN AEROBIC: CPT

## 2020-12-28 PROCEDURE — 93970 EXTREMITY STUDY: CPT

## 2020-12-28 PROCEDURE — 82803 BLOOD GASES ANY COMBINATION: CPT

## 2020-12-28 PROCEDURE — 2000000000 HC ICU R&B

## 2020-12-28 PROCEDURE — 99223 1ST HOSP IP/OBS HIGH 75: CPT | Performed by: INTERNAL MEDICINE

## 2020-12-28 PROCEDURE — 99284 EMERGENCY DEPT VISIT MOD MDM: CPT

## 2020-12-28 PROCEDURE — 2700000000 HC OXYGEN THERAPY PER DAY

## 2020-12-28 PROCEDURE — 94640 AIRWAY INHALATION TREATMENT: CPT

## 2020-12-28 PROCEDURE — 96374 THER/PROPH/DIAG INJ IV PUSH: CPT

## 2020-12-28 PROCEDURE — P9016 RBC LEUKOCYTES REDUCED: HCPCS

## 2020-12-28 PROCEDURE — 85379 FIBRIN DEGRADATION QUANT: CPT

## 2020-12-28 PROCEDURE — 78582 LUNG VENTILAT&PERFUS IMAGING: CPT

## 2020-12-28 PROCEDURE — C9113 INJ PANTOPRAZOLE SODIUM, VIA: HCPCS | Performed by: NURSE PRACTITIONER

## 2020-12-28 PROCEDURE — 6360000004 HC RX CONTRAST MEDICATION: Performed by: EMERGENCY MEDICINE

## 2020-12-28 PROCEDURE — 36430 TRANSFUSION BLD/BLD COMPNT: CPT

## 2020-12-28 PROCEDURE — 71045 X-RAY EXAM CHEST 1 VIEW: CPT

## 2020-12-28 PROCEDURE — 82570 ASSAY OF URINE CREATININE: CPT

## 2020-12-28 PROCEDURE — 99222 1ST HOSP IP/OBS MODERATE 55: CPT | Performed by: INTERNAL MEDICINE

## 2020-12-28 PROCEDURE — 86850 RBC ANTIBODY SCREEN: CPT

## 2020-12-28 PROCEDURE — 86038 ANTINUCLEAR ANTIBODIES: CPT

## 2020-12-28 PROCEDURE — 84165 PROTEIN E-PHORESIS SERUM: CPT

## 2020-12-28 PROCEDURE — G0328 FECAL BLOOD SCRN IMMUNOASSAY: HCPCS

## 2020-12-28 RX ORDER — DEXAMETHASONE SODIUM PHOSPHATE 10 MG/ML
6 INJECTION, SOLUTION INTRAMUSCULAR; INTRAVENOUS ONCE
Status: COMPLETED | OUTPATIENT
Start: 2020-12-28 | End: 2020-12-28

## 2020-12-28 RX ORDER — CARVEDILOL 3.12 MG/1
3.12 TABLET ORAL 2 TIMES DAILY WITH MEALS
Status: DISCONTINUED | OUTPATIENT
Start: 2020-12-28 | End: 2020-12-31 | Stop reason: HOSPADM

## 2020-12-28 RX ORDER — CARVEDILOL 25 MG/1
25 TABLET ORAL 2 TIMES DAILY WITH MEALS
Status: DISCONTINUED | OUTPATIENT
Start: 2020-12-28 | End: 2020-12-28

## 2020-12-28 RX ORDER — NICOTINE POLACRILEX 4 MG
15 LOZENGE BUCCAL PRN
Status: DISCONTINUED | OUTPATIENT
Start: 2020-12-28 | End: 2020-12-31 | Stop reason: HOSPADM

## 2020-12-28 RX ORDER — DEXAMETHASONE 4 MG/1
6 TABLET ORAL DAILY
Status: DISCONTINUED | OUTPATIENT
Start: 2020-12-29 | End: 2020-12-31 | Stop reason: HOSPADM

## 2020-12-28 RX ORDER — DEXTROSE MONOHYDRATE 50 MG/ML
100 INJECTION, SOLUTION INTRAVENOUS PRN
Status: DISCONTINUED | OUTPATIENT
Start: 2020-12-28 | End: 2020-12-31 | Stop reason: HOSPADM

## 2020-12-28 RX ORDER — FOLIC ACID 1 MG/1
1 TABLET ORAL DAILY
Status: DISCONTINUED | OUTPATIENT
Start: 2020-12-28 | End: 2020-12-31 | Stop reason: HOSPADM

## 2020-12-28 RX ORDER — CLONIDINE HYDROCHLORIDE 0.1 MG/1
0.1 TABLET ORAL 2 TIMES DAILY
Status: DISCONTINUED | OUTPATIENT
Start: 2020-12-28 | End: 2020-12-29

## 2020-12-28 RX ORDER — ISOSORBIDE DINITRATE 10 MG/1
10 TABLET ORAL 3 TIMES DAILY
Status: DISCONTINUED | OUTPATIENT
Start: 2020-12-28 | End: 2020-12-31 | Stop reason: HOSPADM

## 2020-12-28 RX ORDER — MEXILETINE HYDROCHLORIDE 150 MG/1
150 CAPSULE ORAL DAILY
Status: DISCONTINUED | OUTPATIENT
Start: 2020-12-28 | End: 2020-12-28

## 2020-12-28 RX ORDER — FUROSEMIDE 40 MG/1
40 TABLET ORAL DAILY
Status: DISCONTINUED | OUTPATIENT
Start: 2020-12-28 | End: 2020-12-31 | Stop reason: HOSPADM

## 2020-12-28 RX ORDER — DEXTROSE MONOHYDRATE 25 G/50ML
12.5 INJECTION, SOLUTION INTRAVENOUS PRN
Status: DISCONTINUED | OUTPATIENT
Start: 2020-12-28 | End: 2020-12-31 | Stop reason: HOSPADM

## 2020-12-28 RX ORDER — ACETAMINOPHEN 650 MG/1
650 SUPPOSITORY RECTAL EVERY 6 HOURS PRN
Status: DISCONTINUED | OUTPATIENT
Start: 2020-12-28 | End: 2020-12-31 | Stop reason: HOSPADM

## 2020-12-28 RX ORDER — IPRATROPIUM BROMIDE AND ALBUTEROL SULFATE 2.5; .5 MG/3ML; MG/3ML
1 SOLUTION RESPIRATORY (INHALATION) EVERY 4 HOURS PRN
Status: DISCONTINUED | OUTPATIENT
Start: 2020-12-28 | End: 2020-12-28

## 2020-12-28 RX ORDER — FLUTICASONE PROPIONATE 50 MCG
1 SPRAY, SUSPENSION (ML) NASAL 2 TIMES DAILY
Status: DISCONTINUED | OUTPATIENT
Start: 2020-12-28 | End: 2020-12-31 | Stop reason: HOSPADM

## 2020-12-28 RX ORDER — ATORVASTATIN CALCIUM 10 MG/1
10 TABLET, FILM COATED ORAL NIGHTLY
Status: DISCONTINUED | OUTPATIENT
Start: 2020-12-28 | End: 2020-12-31 | Stop reason: HOSPADM

## 2020-12-28 RX ORDER — AMLODIPINE BESYLATE 5 MG/1
10 TABLET ORAL DAILY
Status: DISCONTINUED | OUTPATIENT
Start: 2020-12-28 | End: 2020-12-31 | Stop reason: HOSPADM

## 2020-12-28 RX ORDER — 0.9 % SODIUM CHLORIDE 0.9 %
30 INTRAVENOUS SOLUTION INTRAVENOUS ONCE
Status: COMPLETED | OUTPATIENT
Start: 2020-12-28 | End: 2020-12-28

## 2020-12-28 RX ORDER — LANOLIN ALCOHOL/MO/W.PET/CERES
325 CREAM (GRAM) TOPICAL
Status: DISCONTINUED | OUTPATIENT
Start: 2020-12-29 | End: 2020-12-31 | Stop reason: HOSPADM

## 2020-12-28 RX ORDER — SUCRALFATE 1 G/1
1 TABLET ORAL EVERY 6 HOURS SCHEDULED
Status: DISCONTINUED | OUTPATIENT
Start: 2020-12-28 | End: 2020-12-31 | Stop reason: HOSPADM

## 2020-12-28 RX ORDER — HEPARIN SODIUM 5000 [USP'U]/ML
5000 INJECTION, SOLUTION INTRAVENOUS; SUBCUTANEOUS EVERY 8 HOURS SCHEDULED
Status: DISCONTINUED | OUTPATIENT
Start: 2020-12-28 | End: 2020-12-28

## 2020-12-28 RX ORDER — ACETAMINOPHEN 325 MG/1
650 TABLET ORAL EVERY 6 HOURS PRN
Status: DISCONTINUED | OUTPATIENT
Start: 2020-12-28 | End: 2020-12-31 | Stop reason: HOSPADM

## 2020-12-28 RX ORDER — FUROSEMIDE 40 MG/1
40 TABLET ORAL DAILY
Status: DISCONTINUED | OUTPATIENT
Start: 2020-12-28 | End: 2020-12-28

## 2020-12-28 RX ORDER — PANTOPRAZOLE SODIUM 40 MG/10ML
40 INJECTION, POWDER, LYOPHILIZED, FOR SOLUTION INTRAVENOUS 2 TIMES DAILY
Status: DISCONTINUED | OUTPATIENT
Start: 2020-12-28 | End: 2020-12-31 | Stop reason: HOSPADM

## 2020-12-28 RX ORDER — ALBUTEROL SULFATE 90 UG/1
2 AEROSOL, METERED RESPIRATORY (INHALATION) EVERY 6 HOURS PRN
Status: DISCONTINUED | OUTPATIENT
Start: 2020-12-28 | End: 2020-12-31 | Stop reason: HOSPADM

## 2020-12-28 RX ADMIN — PANTOPRAZOLE SODIUM 40 MG: 40 INJECTION, POWDER, FOR SOLUTION INTRAVENOUS at 23:03

## 2020-12-28 RX ADMIN — Medication 6 MILLICURIE: at 18:40

## 2020-12-28 RX ADMIN — DEXAMETHASONE SODIUM PHOSPHATE 6 MG: 10 INJECTION, SOLUTION INTRAMUSCULAR; INTRAVENOUS at 13:15

## 2020-12-28 RX ADMIN — IOHEXOL 30 ML: 300 INJECTION, SOLUTION INTRAVENOUS at 21:12

## 2020-12-28 RX ADMIN — INSULIN LISPRO 1 UNITS: 100 INJECTION, SOLUTION INTRAVENOUS; SUBCUTANEOUS at 23:04

## 2020-12-28 RX ADMIN — ISOSORBIDE DINITRATE 10 MG: 10 TABLET ORAL at 23:03

## 2020-12-28 RX ADMIN — ATORVASTATIN CALCIUM 10 MG: 10 TABLET, FILM COATED ORAL at 23:03

## 2020-12-28 RX ADMIN — FUROSEMIDE 40 MG: 40 TABLET ORAL at 18:33

## 2020-12-28 RX ADMIN — SUCRALFATE 1 G: 1 TABLET ORAL at 23:14

## 2020-12-28 RX ADMIN — AMLODIPINE BESYLATE 10 MG: 5 TABLET ORAL at 18:35

## 2020-12-28 RX ADMIN — CARVEDILOL 3.12 MG: 3.12 TABLET, FILM COATED ORAL at 18:37

## 2020-12-28 RX ADMIN — ALBUTEROL SULFATE 2 PUFF: 90 AEROSOL, METERED RESPIRATORY (INHALATION) at 18:09

## 2020-12-28 RX ADMIN — SODIUM CHLORIDE 1000 ML: 9 INJECTION, SOLUTION INTRAVENOUS at 13:14

## 2020-12-28 RX ADMIN — IPRATROPIUM BROMIDE 2 PUFF: 17 AEROSOL, METERED RESPIRATORY (INHALATION) at 18:10

## 2020-12-28 RX ADMIN — IPRATROPIUM BROMIDE 0.5 MG: 0.5 SOLUTION RESPIRATORY (INHALATION) at 15:20

## 2020-12-28 ASSESSMENT — ENCOUNTER SYMPTOMS
SHORTNESS OF BREATH: 0
CONSTIPATION: 0
RHINORRHEA: 0
WHEEZING: 0
VOMITING: 0
NAUSEA: 0
COLOR CHANGE: 0
DIARRHEA: 0
COUGH: 0
SINUS PRESSURE: 0
ABDOMINAL PAIN: 0
SORE THROAT: 0

## 2020-12-28 NOTE — PLAN OF CARE
[facility]   Plan of Care Note    Oliver Escobedo is a 66 y.o. female patient. Hospitalization Day:0    Briefly:  Information taken chart and staff as pt is COVID positive  PMH includes CHF, Chronic Hep C, DM, [place], PAF, GERD. 66 yr old female admitted with abdominal pain, shortness of breath and had recent COVID exposure. She tested positive. GI was consulted for anemia-admitting Hgb is 6.0 and she is receiving 1 unit PRBC's-repeat Hgb was 7.3. Staff denies any active bleeding. Pt has long standing hx of anemia-average Hgb aprox 8  Last iron panel was normal expect for low TIBC    10/2020 Colonoscopy at [facility] with polypectomy, many small mouth diverticula in sigmoid colon, non-bleeding external hemorrhoids  5/2020 EGD at [facility] showed gastric edema, erosions, erythema, friability and linear erosions in gastric body. Grade D esophagitis     7/3/2020 bone marrow biopsy showed iron deficient anemia    Data Review:  LABS and IMAGING:     CBC  Recent Labs     12/28/20  1230 12/28/20  1710   WBC 5.8 5.5   HGB 6.0* 7.3*   MCV 90.3 90.3   RDW 17.8* 16.9*    204       ANEMIA STUDIES  Recent Labs     12/28/20  1230   FERRITIN 153*       BMP  Recent Labs     12/28/20  1230 12/28/20  1457     --    K 4.1  --      --    CO2 23  --    BUN 29*  --    CREATININE 1.72*  --    GLUCOSE 241* 148   CALCIUM 8.1*  --        LFTS  No results for input(s): ALKPHOS, ALT, AST, BILITOT, BILIDIR, LABALBU in the last 72 hours. AMYLASE/LIPASE/AMMONIA  No results for input(s): AMYLASE, LIPASE, AMMONIA in the last 72 hours. Radiology Review:    No results found.     Principal Problem:    Anemia  Active Problems:    Type 2 diabetes mellitus with hyperglycemia (HCC)    Coronary artery disease involving native coronary artery of native heart without angina pectoris    Acute kidney injury ([facility] [address]    COVID-19    Alzheimer's disease ([facility] [address]    Asthma    Benign hypertension Cardiac pacemaker in situ    Chronic diastolic heart failure (HCC)    COPD (chronic obstructive pulmonary disease) (HCC)    History of cardiac arrest    Hypercholesterolemia    Long term current use of anticoagulant therapy    Presence of automatic implantable cardioverter-defibrillator    Ventricular tachycardia, polymorphic (HCC)  Resolved Problems:    * No resolved hospital problems. *       GI Assessment:    66 yr old female admitted with COVID infection and anemia. No blood loss since admission. Pt transfused and Hgb is now stable at 7.3        Plan of care:    1. Give lack of overt bleeding, hx of SALLY and active COVID infection, we will defer EGD until she is COVID negative. 2. Rec medical mgt at the time  3. PPI IV BID  4. Carafate 1 gram QID-crush and mix with small amount of water for administration  5. Monitor for active bleeding  6. Daily CBC, BMP. Hgb q 12 hrs  7. [id number] [address] for diet if ok with other services  8. Will follow    Complete consult note and plan of care to follow per Dr. Michael Paz MD    Thank you for allowing me to participate in the care of your patient. Please feel free to contact me with any questions or concerns.      Pari Myles, [address]  Gastroenterology  [phone]

## 2020-12-28 NOTE — PROGRESS NOTES
Delivered oral omnipaque at 1720. Pt currently receiving blood transfusion. Requested RN to call when patient completes contrast to allow for transit time of 1 hour.      ENS

## 2020-12-28 NOTE — CONSULTS
Renal Consult Note    Patient :  Opal Martinez; 66 y.o. MRN# [id number]  Location:  [facility]  Attending:  Lupe Mercer MD  Admit Date:  12/28/2020   Hospital Day: 0    Reason for Consult:     Asked by Dr Lupe Mercer MD to see for LAN/Elevated Creatinine. History Obtained From:     patient    History of Present Illness:     Opal Martinez; 66 y.o. female with past medical history as mentioned below. Known h/o DM2, HTN, polymorphic V Fib and arrest in 2016 s/p AICD, COPD. She also has CKD stage 3 baseline 1.1-1.2. In addition has h/o anemia recent evalauation 2 weeks ago by [facility] service. Now presents after being exposed to [place] by her grandsons girlfriend estela 10 days ago. Discussed with her pulmonologist was prescribed oral steroids. Therafter started developing weakness, poor concentration and abdominal pain. On presentation she was found to have Hb of 6, creat 1.8 and CXR showed interstitial changes both lungs. Nephrology consulted for LAN. D dimer positive has been on anticoagulation at home. Has also been on metformin. Lactic acid on presentation was 4.6, anion gap was 4.6, blood sygars 200-300 range. Previous IFx showed IgG kappa m spike quantified at 0.43, in May 2020      No history of recent contrast exposure, No h/o prolonged NSAIDs use in the past, No h/o nephrolithiasis, No recent skin rashes or arthralgias, No hematuria or pyuria noticed in the recent past. Doesn't report any reduction in the urine output recently. Non report of any obstructive urinary symptoms (urgency, frequency, weak stream, straining while urination). No h/o recurrent UTIs in the past.    Past History/Allergies? Social History:     Past Medical History:   Diagnosis Date    Asthma     CHF (congestive heart failure) (HCC)     Chronic obstructive pulmonary disease with acute exacerbation (HCC)     Diabetes mellitus (HCC)     Gastroesophageal reflux disease without esophagitis  Hep C w/o coma, chronic (HCC)     PAF (paroxysmal atrial fibrillation) (HCC)     Systolic and diastolic CHF, chronic (HCC)        Allergies   Allergen Reactions    Codeine     Lisinopril     Percocet [Oxycodone-Acetaminophen]        Social History     Socioeconomic History    Marital status: Single     Spouse name: Not on file    Number of children: Not on file    Years of education: Not on file    Highest education level: Not on file   Occupational History    Not on file   Social Needs    Financial resource strain: Not on file    Food insecurity     Worry: Not on file     Inability: Not on file    Transportation needs     Medical: Not on file     Non-medical: Not on file   Tobacco Use    Smoking status: Former Smoker    Smokeless tobacco: Never Used   Substance and Sexual Activity    Alcohol use: No    Drug use: No    Sexual activity: Not on file   Lifestyle    Physical activity     Days per week: Not on file     Minutes per session: Not on file    Stress: Not on file   Relationships    Social connections     Talks on phone: Not on file     Gets together: Not on file     Attends Restorationism service: Not on file     Active member of club or organization: Not on file     Attends meetings of clubs or organizations: Not on file     Relationship status: Not on file    Intimate partner violence     Fear of current or ex partner: Not on file     Emotionally abused: Not on file     Physically abused: Not on file     Forced sexual activity: Not on file   Other Topics Concern    Not on file   Social History Narrative    Not on file       Family History:        Family History   Problem Relation Age of Onset    Diabetes Mother     Diabetes Father        Outpatient Medications:     Not in a hospital admission.     Current Medications:     Scheduled Meds:    [START ON 12/29/2020] ferrous sulfate  325 mg Oral Daily with breakfast    atorvastatin  10 mg Oral Nightly    Magnesium Oxide  500 mg Oral TID  folic acid  1 mg Oral Daily    isosorbide dinitrate  10 mg Oral TID    [START ON 2020] dexamethasone  6 mg Oral Daily    amLODIPine  10 mg Oral Daily    [Held by provider] cloNIDine  0.1 mg Oral BID    fluticasone  1 spray Nasal BID    insulin lispro  0-6 Units Subcutaneous TID     insulin lispro  0-3 Units Subcutaneous Nightly    pantoprazole  40 mg Intravenous BID    ipratropium  2 puff Inhalation 4x Daily    furosemide  40 mg Oral Daily    carvedilol  3.125 mg Oral BID      Continuous Infusions:    dextrose       PRN Meds:  acetaminophen **OR** acetaminophen, glucose, dextrose, glucagon (rDNA), dextrose, albuterol sulfate HFA    Review of Systems:     Constitutional: No fever, no chills, no lethargy, no weakness. HEENT:  No headache, otalgia, itchy eyes, nasal discharge or sore throat. Cardiac:  No chest pain, dyspnea, orthopnea or PND. Chest:  No cough, phlegm or wheezing. Abdomen:  No abdominal pain, nausea or vomiting. Neuro:  No focal weakness, abnormal movements orseizure like activity. Skin:   No rashes, no itching. :   No hematuria, no pyuria, no dysuria, no flank pain. Extremities:  No swelling or joint pains. ROS was otherwise negative except as mentioned in the [address] Input/Output:       No intake/output data recorded. Patient Vitals for the past 96 hrs (Last 3 readings):   Weight   20 1216 190 lb (86.2 kg)     Vital Signs:   Temperature:  Temp: 98.7 °F (37.1 °C)  TMax:   Temp (24hrs), Av.3 °F (36.8 °C), Min:97.6 °F (36.4 °C), Max:98.7 °F (37.1 °C)    Respirations:  Resp: 20  Pulse:   Pulse: 62  BP:    BP: (!) 143/58  BP Range: Systolic (07LTV), WHU:720 , Min:133 , KEZ:402       Diastolic (32VZZ), NBX:40, Min:39, Max:60      Physical Examination:     General:  AAO x 3, speaking in full sentences, no accessory muscle use. HEENT: Atraumatic, normocephalic, no throat congestion, moist mucosa. Eyes:   Pupils equal, round and reactive to light, EOMI. Neck:   No JVD, no thyromegaly, no lymphadenopathy. Chest:  Bilateral vesicular breath sounds, no rales or wheezes. Cardiac:  S1 S2 RR, no murmurs, gallops or rubs, JVP not raised. Abdomen: Soft, non-tender, no masses or organomegaly, BS audible. :   No suprapubic or flank tenderness. Neuro:  AAO x 3, No FND. SKIN:  No rashes, good skin turgor. Extremities:  No edema, palpable peripheral pulses, no calf tenderness. Labs:       Recent Labs     12/28/20  1230 12/28/20  1710   WBC 5.8 5.5   RBC 2.27* 2.68*   HGB 6.0* 7.3*   HCT 20.5* 24.2*   MCV 90.3 90.3   MCH 26.4 27.2   MCHC 29.3 30.2   RDW 17.8* 16.9*    204   MPV 11.3 11.9      BMP:   Recent Labs     12/28/20  1230 12/28/20  1457     --    K 4.1  --      --    CO2 23  --    BUN 29*  --    CREATININE 1.72*  --    GLUCOSE 241* 148   CALCIUM 8.1*  --       Phosphorus:   No results for input(s): PHOS in the last 72 hours. Magnesium:  No results for input(s): MG in the last 72 hours. Albumin:  No results for input(s): LABALBU in the last 72 hours. BNP:      Lab Results   Component Value Date    BNP 1,141 08/10/2012     DARCI:      Lab Results   Component Value Date    DARCI NEGATIVE 05/27/2020     SPEP:  Lab Results   Component Value Date    PROT 6.2 05/27/2020    PROT 6.2 05/27/2020    ALBCAL 3.6 05/27/2020    ALBPCT 58 05/27/2020    LABALPH 0.2 05/27/2020    LABALPH 0.7 05/27/2020    A1PCT 4 05/27/2020    A2PCT 11 05/27/2020    LABBETA 0.6 05/27/2020    BETAPCT 10 05/27/2020    GAMGLOB 1.1 05/27/2020    GGPCT 17 05/27/2020    PATH ELECTRONICALLY SIGNED. Adriana Hermosillo M.D. 05/27/2020    PATH ELECTRONICALLY SIGNED.  Adriana Hermosillo M.D. 05/27/2020     UPEP:   No results found for: LABPE  C3:   No results found for: C3  C4:   No results found for: C4  MPO ANCA:   No results found for: MPO  PR3 ANCA:   No results found for: PR3  Anti-GBM:   No results found for: GBMABIGG  Hep BsAg:         Lab Results   Component Value Date HEPBSAG NONREACTIVE 02/11/2016     Hep C AB:          Lab Results   Component Value Date    HEPCAB REACTIVE 02/11/2016       Urinalysis/Chemistries:      Lab Results   Component Value Date    NITRU NEGATIVE 04/09/2016    COLORU YELLOW 04/09/2016    PHUR 7.5 04/09/2016    WBCUA 5 TO 10 04/09/2016    RBCUA 0 TO 2 04/09/2016    MUCUS NOT REPORTED 04/09/2016    TRICHOMONAS NOT REPORTED 04/09/2016    YEAST NOT REPORTED 04/09/2016    BACTERIA NOT REPORTED 04/09/2016    SPECGRAV 1.014 04/09/2016    LEUKOCYTESUR TRACE 04/09/2016    UROBILINOGEN Normal 04/09/2016    BILIRUBINUR NEGATIVE 04/09/2016    BILIRUBINUR NEGATIVE 04/22/2012    GLUCOSEU NEGATIVE 04/09/2016    GLUCOSEU NEGATIVE 04/22/2012    KETUA NEGATIVE 04/09/2016    AMORPHOUS NOT REPORTED 04/09/2016     Urine Sodium:   No results found for: TALYA  Urine Potassium:  No results found for: KUR  Urine Chloride:  No results found for: CLUR  Urine Osmolarity: No results found for: OSMOU  Urine Protein:   No results found for: TPU  Urine Creatinine:   No results found for: LABCREA  UPC:     Urine Eosinophils:  No components found for: UEOS    Radiology:     CXR:     Assessment:     1. Acute Kidney Injury: likely ischemic ATn from acute anemia, SIRS from COVID 19 pneumonia, baseline 1.2-1.2 peaked at 1.8, expect it to recover  2. CKD stage 3 from DM2  3. COVID 19 pneumnia without overt resp failure  4. Acute anemia with symptoms  5. DM2   6. IgG kappa MGUS  7. Lactic acidosis from metformin use  8. COPD  9. CHF compensated    Plan:   1. Agree with transfusion  2. lasix between   3. Will Check Renal Ultrasound to r/o element of obstruction and to assess the kidney size/echotexture. 4. Comprehensive urine testing including Urinalysis, Urine sodium, potassium, chloride, Urine protein and creatinine to quantify the proteinuria if any at all. Will check urinary eosinophils as well. 5. Will Order serum and urine protein electrophoresis to r/o element to occult paraprotein disease. 6. Will order Hepatitis B and C, DARCI, ANCA, Complement levels. 7. Keep SBP > 110  8. Follow renal fxn    Nutrition   Please ensure that patient is on a renal diet/TF. Avoid nephrotoxic drugs/contrast exposure. Thank you for the consultation. Please do not hesitate to contact us for any further questions/concerns. We will continue to follow along with you.

## 2020-12-28 NOTE — Clinical Note
Patient Class: Inpatient [101]   REQUIRED: Diagnosis: Pneumonia due to COVID-19 virus [[id number]]   Estimated Length of Stay: Estimated stay of more than 2 midnights   Telemetry Bed Required?: Yes

## 2020-12-28 NOTE — ED PROVIDER NOTES
[address] [place] [address] ED  eMERGENCY dEPARTMENT eNCOUnter      Pt Name: Marissa Coppola  MRN: [id number]  [place] 1945  Date of evaluation: 12/28/2020  Provider: Zeina Peraza NP, CHRIS - Adia [id number]       Chief Complaint   Patient presents with    Abdominal Pain     pt has been exposed to covid         HISTORY OF PRESENT ILLNESS  (Location/Symptom, Timing/Onset, Context/Setting, Quality, Duration, Modifying Factors, Severity.)   Marissa Copploa is a 66 y.o. female who presents to the emergency department private vehicle for evaluation of shortness of breath. Patient states that he has not felt well for the last 2 weeks. She thought her grandsons girlfriend was positive for COVID-19. She had exposure to her. She states that she called her lung specialist doctor Sachin Ag and he started her on some prednisone which she states has not been helping. She states that she has had chills. She denies any vomiting or diarrhea. Nursing Notes were reviewed.     ALLERGIES     Codeine, Lisinopril, and Percocet [oxycodone-acetaminophen]    CURRENT MEDICATIONS       Previous Medications    ACETAMINOPHEN 500 MG CAPS    Take 100 mg by mouth 2 times daily    ALBUTEROL SULFATE  (90 BASE) MCG/ACT INHALER    Inhale 2 puffs into the lungs daily    ALENDRONATE (FOSAMAX) 70 MG TABLET    Take 70 mg by mouth every 7 days    AMLODIPINE (NORVASC) 10 MG TABLET    Take 10 mg by mouth daily    ASPIRIN 81 MG TABLET    Take 81 mg by mouth daily    ATORVASTATIN (LIPITOR) 10 MG TABLET    Take 10 mg by mouth nightly    B COMPLEX-FOLIC ACID (B COMPLEX-VITAMIN B12 PO)    Take 1 tablet by mouth as needed    BISACODYL (DULCOLAX) 5 MG EC TABLET    Take 5 mg by mouth as needed    CARVEDILOL (COREG) 25 MG TABLET    Take 25 mg by mouth 2 times daily (with meals)    CARVEDILOL (COREG) 25 MG TABLET    Take 25 mg by mouth 2 times daily    CLONIDINE (CATAPRES) 0.3 MG/24HR    Place 1 patch onto the skin continuous DICLOFENAC SODIUM 1 % GEL    Apply 2 g topically as needed    FERROUS SULFATE 325 (65 FE) MG TABLET    Take 325 Doses by mouth daily    FEXOFENADINE (ALLEGRA) 180 MG TABLET    Take 180 mg by mouth daily    FLUTICASONE (FLONASE) 50 MCG/ACT NASAL SPRAY    1 spray by Nasal route 2 times daily    FOLIC ACID (FOLVITE) 1 MG TABLET    Take 1 tablet by mouth    FUROSEMIDE (LASIX) 40 MG TABLET    Take 40 mg by mouth daily    GUAIFENESIN (MUCINEX) 600 MG EXTENDED RELEASE TABLET    Take 1,200 mg by mouth as needed    HYDRALAZINE (APRESOLINE) 50 MG TABLET    Take 50 mg by mouth    IPRATROPIUM (ATROVENT) 0.02 % NEBULIZER SOLUTION    Inhale 0.5 mg into the lungs as needed    ISOSORBIDE DINITRATE (ISORDIL) 10 MG TABLET    Take 10 mg by mouth 3 times daily    MAGNESIUM OXIDE 500 MG CAPS    Take 500 mg by mouth 3 times daily    METFORMIN (GLUCOPHAGE) 500 MG TABLET    Take 500 mg by mouth 2 times daily    MEXILETINE (MEXITIL) 150 MG CAPSULE    Take 150 mg by mouth daily    NONFORMULARY    Take 75 mg by mouth 2 times daily Indications: [employer]    OMEPRAZOLE (PRILOSEC OTC) 20 MG TABLET    Take 40 mg by mouth 2 times daily    POTASSIUM CHLORIDE (KLOR-CON M) 20 MEQ EXTENDED RELEASE TABLET    Take 20 mEq by mouth 3 times daily    PROBENECID (BENEMID) 500 MG TABLET    Take 500 mg by mouth daily       PAST MEDICAL HISTORY         Diagnosis Date    Asthma     CHF (congestive heart failure) (HCC)     Diabetes mellitus (HCC)     Hep C w/o coma, chronic (HCC)        SURGICAL HISTORY           Procedure Laterality Date    PACEMAKER INSERTION           FAMILY HISTORY           Problem Relation Age of Onset    Diabetes Mother     Diabetes Father      Family Status   Relation Name Status    Mother  (Not Specified)    Father  (Not Specified)        SOCIAL HISTORY      reports that she has quit smoking. She has never used smokeless tobacco. She reports that she does not drink alcohol or use drugs.     REVIEW OF SYSTEMS Mental Status: She is alert and oriented to person, place, and time. RADIOLOGY:   Non-plain film images such as CT, Ultrasound and MRI are read by the radiologist. Tigre Dill radiographic images are visualized and preliminarily interpreted by the emergency physician with the below findings:    Xr Chest Portable    Result Date: 12/28/2020  EXAMINATION: ONE XRAY VIEW OF THE CHEST 12/28/2020 12:43 pm COMPARISON: 04/09/2016. HISTORY: ORDERING SYSTEM PROVIDED HISTORY: Chest Pain TECHNOLOGIST PROVIDED HISTORY: Chest Pain Reason for Exam: chest pain and sob Acuity: Unknown Type of Exam: Unknown Relevant Medical/Surgical History: chest pain and sob FINDINGS: The cardiac silhouette is mildly enlarged and stable. Aortic vascular calcification. Increased interstitial markings throughout the lungs with no focal consolidation or significant pleural fluid. Left-sided AICD device. Increased interstitial changes throughout the lungs, possibly infiltrate or edema. Mild cardiomegaly. Interpretation per the Radiologist below, if available at the time of this note:    XR CHEST PORTABLE   Final Result   Increased interstitial changes throughout the lungs, possibly infiltrate or   edema. Mild cardiomegaly.          CT ABDOMEN PELVIS WO CONTRAST Additional Contrast? Oral    (Results Pending)           LABS:  Labs Reviewed   CBC WITH AUTO DIFFERENTIAL - Abnormal; Notable for the following components:       Result Value    RBC 2.27 (*)     Hemoglobin 6.0 (*)     Hematocrit 20.5 (*)     RDW 17.8 (*)     NRBC Automated 2.4 (*)     Seg Neutrophils 81 (*)     Lymphocytes 11 (*)     Eosinophils % 0 (*)     Immature Granulocytes 1 (*)     Absolute Lymph # 0.64 (*)     All other components within normal limits   BASIC METABOLIC PANEL - Abnormal; Notable for the following components:    Glucose 241 (*)     BUN 29 (*)     CREATININE 1.72 (*)     Calcium 8.1 (*)     GFR Non- 29 (*) GFR  35 (*)     All other components within normal limits   LACTATE DEHYDROGENASE - Abnormal; Notable for the following components:     (*)     All other components within normal limits   FERRITIN - Abnormal; Notable for the following components:    Ferritin 153 (*)     All other components within normal limits   LACTIC ACID - Abnormal; Notable for the following components:    Lactic Acid 4.6 (*)     All other components within normal limits   COVID-19 - Abnormal; Notable for the following components:    SARS-CoV-2, Rapid DETECTED (*)     All other components within normal limits   D-DIMER, QUANTITATIVE - Abnormal; Notable for the following components:    D-Dimer, Quant 3.34 (*)     All other components within normal limits   BRAIN NATRIURETIC PEPTIDE - Abnormal; Notable for the following components:    Pro-BNP 12,509 (*)     All other components within normal limits   PROCALCITONIN - Abnormal; Notable for the following components:    Procalcitonin 0.36 (*)     All other components within normal limits   POC PANEL (G3)-ART - Abnormal; Notable for the following components:    POC pH 7.47 (*)     POC pCO2 31 (*)     POC PO2 62 (*)     All other components within normal limits   POC GLUCOSE FINGERSTICK - Abnormal; Notable for the following components:    POC Glucose 148 (*)     All other components within normal limits   POCT GLUCOSE - Normal   CULTURE, BLOOD 1   CULTURE, BLOOD 1   LACTATE, SEPSIS   LACTATE, SEPSIS   POCT GLUCOSE   POCT GLUCOSE   TYPE AND SCREEN   PREPARE RBC (CROSSMATCH)   PREPARE COVID-19 CONVALESCENT PLASMA       All other labs were within normal range or not returned as of this dictation.     EMERGENCY DEPARTMENT COURSE and DIFFERENTIAL DIAGNOSIS/MDM:   Vitals:    Vitals:    12/28/20 1534 12/28/20 1545 12/28/20 1550 12/28/20 1555   BP:  (!) 144/58 (!) 139/47 (!) 150/60   Pulse: 63 62 60 62   Resp: 18 20 20 22 Temp: 97.6 °F (36.4 °C) 98.1 °F (36.7 °C) 98.4 °F (36.9 °C) 98.4 °F (36.9 °C)   TempSrc:       SpO2: 92% 93% 91% 95%   Weight:       Height:           Medical Decision Making: Patient is COVID-19 positive. Her chest x-ray does not show pneumonia. She was 81% on room air. She is now on 2 L nasal cannula. Her laboratory studies feel acute kidney injury with a creatinine of 1.72 with a normal baseline BUN and creatinine. She also has a hemoglobin of 6.0. She will be admitted to the hospital for further evaluation follow-up. Case was discussed with internal medicine.   Medications   amLODIPine (NORVASC) tablet 10 mg ( Oral Automatically Held 12/31/20 0900)   cloNIDine (CATAPRES) tablet 0.1 mg ( Oral Automatically Held 12/31/20 2100)   fluticasone (FLONASE) 50 MCG/ACT nasal spray 1 spray (has no administration in time range)   furosemide (LASIX) tablet 40 mg ( Oral Automatically Held 12/31/20 0900)   ipratropium (ATROVENT) 0.02 % nebulizer solution 0.5 mg (0.5 mg Nebulization Given 12/28/20 1520)   ipratropium (ATROVENT HFA) 17 MCG/ACT inhaler 2 puff (has no administration in time range)   insulin lispro (HUMALOG) injection vial 0-6 Units (has no administration in time range)   insulin lispro (HUMALOG) injection vial 0-3 Units (has no administration in time range)   glucose (GLUTOSE) 40 % oral gel 15 g (has no administration in time range)   dextrose 50 % IV solution (has no administration in time range)   glucagon (rDNA) injection 1 mg (has no administration in time range)   dextrose 5 % solution (has no administration in time range)   pantoprazole (PROTONIX) injection 40 mg (has no administration in time range)   dexamethasone (PF) (DECADRON) injection 6 mg (6 mg Intravenous Given 12/28/20 1315)   0.9 % sodium chloride bolus (0 mL/kg × 59.3 kg (Ideal) Intravenous Stopped 12/28/20 1514)       CONSULTS:  IP CONSULT TO INTERNAL MEDICINE  IP CONSULT TO PHARMACY  IP CONSULT TO NEPHROLOGY  IP CONSULT TO CARDIOLOGY IP CONSULT TO GI      FINAL IMPRESSION      1. COVID-19    2. Anemia, unspecified type    3. Hypoxia    4. Acute kidney injury [facility])          DISPOSITION/PLAN   DISPOSITION Admitted 12/28/2020 03:28:36 PM      PATIENT REFERRED TO:   No follow-up provider specified.     DISCHARGE MEDICATIONS:     New Prescriptions    No medications on file           (Please note that portions of this note were completed with a voice recognition program.  Efforts were made to edit the dictations but occasionally words are mis-transcribed.)    [address] [place] [address] NP, APRN - CNP  Certified Nurse Practitioner          CHRIS Ron CNP  12/28/20 [id number]

## 2020-12-28 NOTE — H&P
[facility]  Office: [address], , Lopez Tess, DO, Smita Solomon, DO, Gaby Mortonn Blood, DO, Rose Roblero MD, Fortino Wing MD, Riay Hart MD, Massiel Richard MD, Darron David MD, Gregoria Patel MD, Stanley Bahena MD, Domenica Huerta MD, Arpit De Leon MD, Meredith Ernst DO, Tin Sosa MD, Duke Ann MD, Emily Torres DO, Fred Chan MD,  Param Pemberton DO, Rl Patel MD, Shashi Willis MD, Marivel Cardoza CNP, [facility] Johnathon, CNP, Amber Zimmerman, CNP, Radha Snowden, CNS, Maria E Hamilton, CNP, Wallace Mishra, CNP, Matthias Wyatt, CNP, Brooke Gallardo, CNP, Chandler Aguirre, CNP, Jeff Matta PA-C, Reji Pabon, ELLIOT, Laurie Sosa, CNP, Valeriy Baker, CNP, Mert Weinstein, CNP, Mechelle Nunez, CNP, Kate Kelsey, CNP         [address]    HISTORY AND PHYSICAL EXAMINATION            Date:   12/28/2020  Patient name:  Patton Callow  Date of admission:  12/28/2020 12:13 PM  MRN:   [id number]  Account:  [de-identified]  YOB: 1945  PCP:    No primary care provider on file. Room:   [place] [address]  Code Status:    Prior    Chief Complaint:     Chief Complaint   Patient presents with    Abdominal Pain     pt has been exposed to covid       History Obtained From:     patient, electronic medical record    History of Present Illness:     Patton Callow is a 66 y.o. Non-/non  female who presents with Abdominal Pain (pt has been exposed to covid)   and is admitted to the hospital for the management of Anemia. Patient is a 78-year-old female with a past medical history for chronic systolic and diastolic CHF, diabetes mellitus type 2, asthma, COPD, polymorphic V. fib arrest, PAF-status post AICD placement, GERD, CKD stage III who presented to the emergency department for abdominal pain. Significant to note this patient had recent exposure to COVID-19 population-subsequently found to be positive. On admit patient had a hemoglobin of 6.0 and follows with [facility] with most recent evaluation approximately 2 weeks ago. She also demonstrated acute kidney injury on chronic kidney disease stage III, and lactic acidosis without clear etiology. On assessment patient is awake and alert on 2 L low flow nasal cannula without respiratory distress. 1 unit packed red blood cells infusing. Patient denying any chest pain, nausea, vomiting, changes in her stool consistency or odor. Chief concern appears to be abdominal pain at this time. Alert and oriented x3, PERRLA. fine crackles throughout all lung fields without wheezing, RRR, S1-S2 without murmur or gallop. Abdomen is soft, nondistended, tender to palpation in all 4 quadrants but more so in the epigastric region.   Scant bilateral lower extremity pitting edema    Past Medical History:     Past Medical History:   Diagnosis Date    Asthma     CHF (congestive heart failure) (HCC)     Chronic obstructive pulmonary disease with acute exacerbation (HCC)     Diabetes mellitus ([address]     Gastroesophageal reflux disease without esophagitis     Hep C w/o coma, chronic (HCC)     PAF (paroxysmal atrial fibrillation) (HCC)     Systolic and diastolic CHF, chronic ([address]         Past Surgical History:     Past Surgical History:   Procedure Laterality Date    A-V CARDIAC PACEMAKER INSERTION  2016    ATRIAL ABLATION SURGERY  2017    COLONOSCOPY  2020    PACEMAKER INSERTION      TONSILLECTOMY      UPPER GASTROINTESTINAL ENDOSCOPY  2020 Medications Prior to Admission:     Prior to Admission medications    Medication Sig Start Date End Date Taking?  Authorizing Provider   carvedilol (COREG) 25 MG tablet Take 25 mg by mouth 2 times daily (with meals)    Historical Provider, MD   Acetaminophen 500 MG CAPS Take 100 mg by mouth 2 times daily    Historical Provider, MD   albuterol sulfate  (90 Base) MCG/ACT inhaler Inhale 2 puffs into the lungs daily    Historical Provider, MD   alendronate (FOSAMAX) 70 MG tablet Take 70 mg by mouth every 7 days    Historical Provider, MD   amLODIPine (NORVASC) 10 MG tablet Take 10 mg by mouth daily 5/11/18   Historical Provider, MD   aspirin 81 MG tablet Take 81 mg by mouth daily    Historical Provider, MD   atorvastatin (LIPITOR) 10 MG tablet Take 10 mg by mouth nightly    Historical Provider, MD   B Complex-Folic Acid (B COMPLEX-VITAMIN B12 PO) Take 1 tablet by mouth as needed    Historical Provider, MD   bisacodyl (DULCOLAX) 5 MG EC tablet Take 5 mg by mouth as needed 3/2/18   Historical Provider, MD   ferrous sulfate 325 (65 Fe) MG tablet Take 325 Doses by mouth daily 1/25/18   Historical Provider, MD   fexofenadine (ALLEGRA) 180 MG tablet Take 180 mg by mouth daily    Historical Provider, MD   fluticasone (FLONASE) 50 MCG/ACT nasal spray 1 spray by Nasal route 2 times daily    Historical Provider, MD   folic acid (FOLVITE) 1 MG tablet Take 1 tablet by mouth    Historical Provider, MD   furosemide (LASIX) 40 MG tablet Take 40 mg by mouth daily    Historical Provider, MD   guaiFENesin (MUCINEX) 600 MG extended release tablet Take 1,200 mg by mouth as needed    Historical Provider, MD   hydrALAZINE (APRESOLINE) 50 MG tablet Take 50 mg by mouth 9/1/17   Historical Provider, MD   ipratropium (ATROVENT) 0.02 % nebulizer solution Inhale 0.5 mg into the lungs as needed 7/17/17   Historical Provider, MD   metFORMIN (GLUCOPHAGE) 500 MG tablet Take 500 mg by mouth 2 times daily 1/8/18   Historical Provider, MD mexiletine (MEXITIL) 150 MG capsule Take 150 mg by mouth daily 10/4/17   Historical Provider, MD   omeprazole (PRILOSEC OTC) 20 MG tablet Take 40 mg by mouth 2 times daily    Historical Provider, MD   potassium chloride (KLOR-CON M) 20 MEQ extended release tablet Take 20 mEq by mouth 3 times daily    Historical Provider, MD   probenecid (BENEMID) 500 MG tablet Take 500 mg by mouth daily    Historical Provider, MD   carvedilol (COREG) 25 MG tablet Take 25 mg by mouth 2 times daily    Historical Provider, MD   Magnesium Oxide 500 MG CAPS Take 500 mg by mouth 3 times daily 1/21/18   Historical Provider, MD   cloNIDine (CATAPRES) 0.3 MG/24HR Place 1 patch onto the skin continuous    Historical Provider, MD   diclofenac sodium 1 % GEL Apply 2 g topically as needed 8/4/17   Historical Provider, MD   isosorbide dinitrate (ISORDIL) 10 MG tablet Take 10 mg by mouth 3 times daily    Historical Provider, MD   NONFORMULARY Take 75 mg by mouth 2 times daily Indications: [facility]        Allergies:     Codeine, Lisinopril, and Percocet [oxycodone-acetaminophen]    Social History:     Tobacco:    reports that she has quit smoking. She has never used smokeless tobacco.  Alcohol:      reports no history of alcohol use. Drug Use:  reports no history of drug use. Family History:     Family History   Problem Relation Age of Onset    Diabetes Mother     Diabetes Father        Review of Systems:     Positive and Negative as described in HPI.     CONSTITUTIONAL:  negative for fevers, chills, sweats, fatigue, weight loss  HEENT:  negative for vision, hearing changes, runny nose, throat pain  RESPIRATORY:  + shortness of breath,- cough,  - congestion,- wheezing  CARDIOVASCULAR:  negative for chest pain, palpitations  GASTROINTESTINAL:  negative for nausea, vomiting, diarrhea, constipation, change in bowel habits,+ abdominal pain GENITOURINARY:  negative for difficulty of urination, burning with urination, frequency   INTEGUMENT:  negative for rash, skin lesions, easy bruising   HEMATOLOGIC/LYMPHATIC:  negative for swelling/edema   ALLERGIC/IMMUNOLOGIC:  negative for urticaria , itching  ENDOCRINE:  negative increase in drinking, increase in urination, hot or cold intolerance  MUSCULOSKELETAL:  negative joint pains, muscle aches, swelling of joints  NEUROLOGICAL:  negative for headaches, dizziness, lightheadedness, numbness, pain, tingling extremities  BEHAVIOR/PSYCH:  negative for depression, anxiety    Physical Exam:   BP (!) 151/57   Pulse 61   Temp 98.6 °F (37 °C)   Resp 20   Ht 5' 6\" (1.676 m)   Wt 190 lb (86.2 kg)   SpO2 91%   BMI 30.67 kg/m²   Temp (24hrs), Av.2 °F (36.8 °C), Min:97.6 °F (36.4 °C), Max:98.6 °F (37 °C)    Recent Labs     20  1456   POCGLU 148*     No intake or output data in the 24 hours ending 20 1648    General Appearance: alert, well appearing, and in no acute distress  Mental status: oriented to person, place, and time  Head: normocephalic, atraumatic  Eye: no icterus, redness, pupils equal and reactive, extraocular eye movements intact, conjunctiva clear  Ear: normal external ear, no discharge, hearing intact  Mouth: mucous membranes moist  Neck: supple, no carotid bruits, thyroid not palpable  Lungs: Fine crackles throughout, low-flow nasal cannula, normal effort  Cardiovascular: normal rate, regular rhythm, no murmur, gallop, rub, pacer present  Abdomen: Soft, tender to palpation in all quadrants more specifically epigastric, nondistended, normal bowel sounds, no hepatomegaly or splenomegaly  Neurologic: There are no new focal motor or sensory deficits, normal muscle tone and bulk, no abnormal sensation, normal speech, cranial nerves II through XII grossly intact  Skin: No gross lesions, rashes, bruising or bleeding on exposed skin area Extremities: peripheral pulses palpable, trace bilateral lower extremity and pedal edema or calf pain with palpation  Psych: normal affect    Investigations:      Laboratory Testing:  Recent Results (from the past 24 hour(s))   COVID-19    Collection Time: 12/28/20 12:25 PM    Specimen: Other   Result Value Ref Range    SARS-CoV-2          SARS-CoV-2, Rapid DETECTED (A) Not Detected    Source . NASOPHARYNGEAL SWAB     SARS-CoV-2         CBC Auto Differential    Collection Time: 12/28/20 12:30 PM   Result Value Ref Range    WBC 5.8 3.5 - 11.3 k/uL    RBC 2.27 (L) 3.95 - 5.11 m/uL    Hemoglobin 6.0 (LL) 11.9 - 15.1 g/dL    Hematocrit 20.5 (L) 36.3 - 47.1 %    MCV 90.3 82.6 - 102.9 fL    MCH 26.4 25.2 - 33.5 pg    MCHC 29.3 28.4 - 34.8 g/dL    RDW 17.8 (H) 11.8 - 14.4 %    Platelets 954 876 - 072 k/uL    MPV 11.3 8.1 - 13.5 fL    NRBC Automated 2.4 (H) 0.0 per 100 WBC    Differential Type NOT REPORTED     WBC Morphology NOT REPORTED     RBC Morphology NOT REPORTED     Platelet Estimate NOT REPORTED     Seg Neutrophils 81 (H) 36 - 65 %    Lymphocytes 11 (L) 24 - 43 %    Monocytes 7 3 - 12 %    Eosinophils % 0 (L) 1 - 4 %    Basophils 0 0 - 2 %    Immature Granulocytes 1 (H) 0 %    Segs Absolute 4.69 1.50 - 8.10 k/uL    Absolute Lymph # 0.64 (L) 1.10 - 3.70 k/uL    Absolute Mono # 0.41 0.10 - 1.20 k/uL    Absolute Eos # 0.00 0.00 - 0.44 k/uL    Basophils Absolute 0.00 0.00 - 0.20 k/uL    Absolute Immature Granulocyte 0.06 0.00 - 0.30 k/uL    Morphology HYPOCHROMIA PRESENT     Morphology 1+ POLYCHROMASIA    Basic Metabolic Panel    Collection Time: 12/28/20 12:30 PM   Result Value Ref Range    Glucose 241 (H) 70 - 99 mg/dL    BUN 29 (H) 8 - 23 mg/dL    CREATININE 1.72 (H) 0.50 - 0.90 mg/dL    Bun/Cre Ratio 17 9 - 20    Calcium 8.1 (L) 8.6 - 10.4 mg/dL    Sodium 141 135 - 144 mmol/L    Potassium 4.1 3.7 - 5.3 mmol/L    Chloride 105 98 - 107 mmol/L    CO2 23 20 - 31 mmol/L    Anion Gap 13 9 - 17 mmol/L GFR Non-African American 29 (L) >60 mL/min    GFR  35 (L) >60 mL/min    GFR Comment          GFR Staging NOT REPORTED    LACTATE DEHYDROGENASE    Collection Time: 12/28/20 12:30 PM   Result Value Ref Range     (H) 135 - 214 U/L   FERRITIN    Collection Time: 12/28/20 12:30 PM   Result Value Ref Range    Ferritin 153 (H) 13 - 150 ug/L   Lactic Acid    Collection Time: 12/28/20 12:30 PM   Result Value Ref Range    Lactic Acid 4.6 (H) 0.5 - 2.2 mmol/L   D-Dimer, Quantitative    Collection Time: 12/28/20 12:30 PM   Result Value Ref Range    D-Dimer, Quant 3.34 (H) 0.00 - 0.59 mg/L FEU   Brain Natriuretic Peptide    Collection Time: 12/28/20 12:30 PM   Result Value Ref Range    Pro-BNP 12,509 (H) <300 pg/mL    BNP Interpretation Pro-BNP Reference Range:    Procalcitonin    Collection Time: 12/28/20 12:30 PM   Result Value Ref Range    Procalcitonin 0.36 (H) <0.09 ng/mL   TYPE AND SCREEN    Collection Time: 12/28/20  1:12 PM   Result Value Ref Range    Expiration Date 12/31/2020,2359     Arm Band Number [id number]     ABO/Rh B POSITIVE     Antibody Screen NEGATIVE     Unit Number [id number]     Product Code Leukocyte Reduced Red Cell     Unit Divison 00     Dispense Status ISSUED     Transfusion Status OK TO TRANSFUSE     Crossmatch Result COMPATIBLE     Unit Number [id number]     Product Code Leukocyte Reduced Red Cell     Unit Divison 00     Dispense Status ISSUED     Transfusion Status OK TO TRANSFUSE     Crossmatch Result COMPATIBLE    POC Glucose Fingerstick    Collection Time: 12/28/20  2:56 PM   Result Value Ref Range    POC Glucose 148 (H) 65 - 105 mg/dL   POCT Glucose    Collection Time: 12/28/20  2:57 PM   Result Value Ref Range    Glucose 148 mg/dL    QC OK? y    POC PANEL (G3)-ART    Collection Time: 12/28/20  3:20 PM   Result Value Ref Range    POC pH 7.47 (H) 7.35 - 7.45    POC pCO2 31 (L) 32 - 45 mm Hg    POC PO2 62 (L) 75 - 95 mm Hg    TCO2 (calc), Art 24 23 - 28 mmol/L POC HCO3 22.7 22 - 27 mmol/L    Negative Base Excess, Art 1 0.0 - 2.0    Positive Base Excess, Art NOT REPORTED 0.0 - 2.0    POC O2 SAT 93 %    Pt Temp 37.0     O2 Device/Flow/% NOT REPORTED     FIO2 28.0     POC pH Temp NOT REPORTED     POC pCO2 Temp NOT REPORTED mm Hg    POC pO2 Temp NOT REPORTED mm Hg   Lactate, Sepsis    Collection Time: 12/28/20  3:45 PM   Result Value Ref Range    Lactic Acid, Sepsis 1.2 0.5 - 1.9 mmol/L    Lactic Acid, Sepsis, Whole Blood NOT REPORTED 0.5 - 1.9 mmol/L       Imaging/Diagnostics:  Xr Chest Portable    Result Date: 12/28/2020  Increased interstitial changes throughout the lungs, possibly infiltrate or edema. Mild cardiomegaly.        Assessment :      Hospital Problems           Last Modified POA    * (Principal) Anemia 12/28/2020 Yes    Type 2 diabetes mellitus with hyperglycemia ([address] 12/28/2020 Yes    Coronary artery disease involving native coronary artery of native heart without angina pectoris 12/28/2020 Yes    Acute kidney injury ([address] 12/28/2020 Yes    COVID-19 12/28/2020 Yes    Alzheimer's disease ([address] 12/28/2020 Yes    Asthma 12/28/2020 Yes    Benign hypertension 12/28/2020 Yes    Cardiac pacemaker in situ 12/28/2020 Yes    Chronic diastolic heart failure ([address] 12/28/2020 Yes    COPD (chronic obstructive pulmonary disease) ([address] 12/28/2020 Yes    History of cardiac arrest 12/28/2020 Yes    Hypercholesterolemia 12/28/2020 Yes    Long term current use of anticoagulant therapy 12/28/2020 Yes    Presence of automatic implantable cardioverter-defibrillator 12/28/2020 Yes    Ventricular tachycardia, polymorphic ([address] 12/28/2020 Yes          Plan:     Patient status inpatient in the ICU 1. Acute anemia with known history of iron deficiency anemia: On chronic PPI, recently evaluated by GI on 12/19/2020 with follow-up appointment scheduled for 1/13/2021. patient had colonoscopy recently in October 2020 with polypectomy with Dr. Desi Owens. Last EGD was documented in May 2020 showing SALLY, chronic blood loss anemia, gastritis and GERD at that time. it would appear the patient was taking Xarelto 20 mg daily. We will need to clarify patient's home medications for reordering. Status post 2 units packed red blood cells for hemoglobin of 6.0. Hold ASA, Xarelto of patient was in fact taking this  2. Abdominal pain: With elevated procal.  Check CT abdomen pelvis now  3. Elevated D-dimer: Unable to get CTa chest due to renal function, will get VQ scan, vasc duplexes  4. History of V. tach/fib arrest/PAF: Status post [facility] AICD dual-chamber placement in 2016 with follow-up cardiac ablation in 2017. Consult cardiology given GI bleed and most likely need for cardiac clearance for any procedures if warranted   5. Acute on chronic mild systolic and diastolic CHF: Continue diuretic therapy at despite LAN, beta-blocker decreased dose secondary to heart rate in the low 60s, replace potassium as warranted, BNP elevated on admit. Echo reviewed from May 2020 demonstrating an ejection fraction of 45 to 50% with moderate MR severe TR and mild pulmonary hypertension. Get follow-up echo  6. Acute kidney injury superimposed on chronic kidney disease stage IIIa: Avoid nephrotoxic medications. Consult nephrology  7. COVID-19 viral pneumonia: Continue isolation precautions, 2 L low flow nasal cannula,. Patient consented to plasma administration, Decadron given in the emergency department  8. Lactic acidosis: Initially 4.6 on admit. Continue to trend, with elevated pro-José Miguel. Check blood cultures x2, could be secondary to abdominal pain.   Awaiting CT abdomen pelvis results 9. Essential hypertension: stable, patient home medications to be verified by pharmacy. It would appear pt is taking Catapres and Norvasc  10. Diabetes mellitus type 2: Patient taking metformin at home, on hold secondary to kidney function. Start LISSI, monitor patient for hyper/hypoglycemic events  11. COPD with asthma: Exacerbated by CHF, continue home medications. Hold off on starting Zithromax at this time. Patient most likely in need of diuresis versus antibiotic therapy  12. GI/DVT proph: No full pharmacologic anticoagulation secondary to #1, start Protonix twice daily  13. CODE STATUS: Full code    Consultations:   IP CONSULT TO INTERNAL MEDICINE  IP CONSULT TO PHARMACY  IP CONSULT TO CARDIOLOGY  IP CONSULT TO GI  IP CONSULT TO NEPHROLOGY     Patient is admitted as inpatient status because of co-morbidities listed above, severity of signs and symptoms as outlined, requirement for current medical therapies and most importantly because of direct risk to patient if care not provided in a hospital setting. Expected length of stay > 48 hours. CHRIS Cantor NP  12/28/2020  4:48 PM     Attending Supervising Physicians Attestation Statement  I saw and evaluated the patient. I discussed the findings and plans with nurse practitioner and agree as documented in her note     Electronically signed by Shashi Willis MD on 12/29/20 at 2:26 PM EST        Copy sent to Dr. Waller primary care provider on file.

## 2020-12-28 NOTE — ED PROVIDER NOTES
[facility] ED  EMERGENCY DEPARTMENT ENCOUNTER   ATTENDING ATTESTATION     Pt Name: Roni Darnell  MRN: [id number]  [place] 1945  Date of evaluation: 12/28/20       Roni Darnell is a 66 y.o. female who presents with Abdominal Pain (pt has been exposed to covid)      MDM:     17-year-old female presents with complaints of abdominal pain and an exposure to Covid. The patient was having some increasing shortness of breath, her Covid testing was positive. She was admitted to the hospital for further evaluation, oxygen supplementation and evaluation by pulmonology. Vitals:   Vitals:    12/28/20 1615 12/28/20 1650 12/28/20 1655 12/28/20 1700   BP: (!) 151/57 (!) 133/48 (!) 148/55 (!) 143/58   Pulse: 61 62 63 62   Resp: 20 20 20 20   Temp: 98.6 °F (37 °C) 98.6 °F (37 °C) 98.6 °F (37 °C) 98.7 °F (37.1 °C)   TempSrc:       SpO2: 91% 94% 95% 95%   Weight:       Height:             I personally evaluated and examined the patient in conjunction with the Midlevel provider and agree with the assessment, treatment plan, and disposition of the patient as recorded by the midlevel. I performed a history and physical examination of the patient and discussed management with the midlevel. I reviewed the midlevels note and agree with the documented findings and plan of care. Any areas of disagreement are noted on the chart. I was personally present for the key portions of any procedures. I have documented in the chart those procedures where I was not present during the key portions. I have personally reviewed all images and agree with the midlevel's interpretation. I have reviewed the emergency nurses triage note. I agree with the chief complaint, past medical history, past surgical history, allergies, medications, social and family history as documented unless otherwise noted.     Brock Hua MD  Attending Emergency  Physician                 Gulshan Ortega MD  12/28/20 6220

## 2020-12-29 ENCOUNTER — APPOINTMENT (OUTPATIENT)
Dept: ULTRASOUND IMAGING | Age: 75
DRG: 177 | End: 2020-12-29
Payer: MEDICARE

## 2020-12-29 LAB
ABO/RH: NORMAL
ABSOLUTE EOS #: 0 K/UL (ref 0–0.4)
ABSOLUTE IMMATURE GRANULOCYTE: 0.09 K/UL (ref 0–0.3)
ABSOLUTE LYMPH #: 0.23 K/UL (ref 1–4.8)
ABSOLUTE MONO #: 0.27 K/UL (ref 0.2–0.8)
AFP: 1.3 UG/L
ALBUMIN SERPL-MCNC: 2.7 G/DL (ref 3.5–5.2)
ALBUMIN/GLOBULIN RATIO: ABNORMAL (ref 1–2.5)
ALP BLD-CCNC: 72 U/L (ref 35–104)
ALPHA-1 ANTITRYPSIN: 199 MG/DL (ref 90–200)
ALT SERPL-CCNC: 67 U/L (ref 5–33)
AMYLASE: 61 U/L (ref 28–100)
ANION GAP SERPL CALCULATED.3IONS-SCNC: 11 MMOL/L (ref 9–17)
ANTIBODY SCREEN: NEGATIVE
ARM BAND NUMBER: NORMAL
AST SERPL-CCNC: 81 U/L
ATYPICAL LYMPHOCYTE ABSOLUTE COUNT: 0.14 K/UL
ATYPICAL LYMPHOCYTES: 3 %
BASOPHILS # BLD: 0 %
BASOPHILS ABSOLUTE: 0 K/UL (ref 0–0.2)
BILIRUB SERPL-MCNC: 0.42 MG/DL (ref 0.3–1.2)
BILIRUBIN URINE: NEGATIVE
BLD PROD TYP BPU: NORMAL
BLD PROD TYP BPU: NORMAL
BUN BLDV-MCNC: 18 MG/DL (ref 8–23)
BUN/CREAT BLD: 18 (ref 9–20)
C-REACTIVE PROTEIN: <3 MG/L (ref 0–5)
CALCIUM SERPL-MCNC: 8 MG/DL (ref 8.6–10.4)
CERULOPLASMIN: 39 MG/DL (ref 16–45)
CHLORIDE BLD-SCNC: 107 MMOL/L (ref 98–107)
CHOLESTEROL/HDL RATIO: 3.4
CHOLESTEROL: 156 MG/DL
CO2: 23 MMOL/L (ref 20–31)
COLOR: YELLOW
COMMENT UA: NORMAL
COMPLEMENT C3: 110 MG/DL (ref 90–180)
COMPLEMENT C3: 115 MG/DL (ref 90–180)
COMPLEMENT C4: 25 MG/DL (ref 10–40)
COMPLEMENT C4: 26 MG/DL (ref 10–40)
CREAT SERPL-MCNC: 0.99 MG/DL (ref 0.5–0.9)
CREATININE URINE: 106.6 MG/DL (ref 28–217)
CROSSMATCH RESULT: NORMAL
CROSSMATCH RESULT: NORMAL
D-DIMER QUANTITATIVE: 8.6 MG/L FEU (ref 0–0.59)
DIFFERENTIAL TYPE: ABNORMAL
DISPENSE STATUS BLOOD BANK: NORMAL
DISPENSE STATUS BLOOD BANK: NORMAL
EOSINOPHIL,URINE: NORMAL
EOSINOPHILS RELATIVE PERCENT: 0 % (ref 1–4)
EXPIRATION DATE: NORMAL
FIBRINOGEN: 433 MG/DL (ref 179–518)
GFR AFRICAN AMERICAN: >60 ML/MIN
GFR NON-AFRICAN AMERICAN: 54 ML/MIN
GFR SERPL CREATININE-BSD FRML MDRD: ABNORMAL ML/MIN/{1.73_M2}
GFR SERPL CREATININE-BSD FRML MDRD: ABNORMAL ML/MIN/{1.73_M2}
GLUCOSE BLD-MCNC: 140 MG/DL (ref 70–99)
GLUCOSE BLD-MCNC: 147 MG/DL (ref 65–105)
GLUCOSE BLD-MCNC: 151 MG/DL (ref 65–105)
GLUCOSE BLD-MCNC: 213 MG/DL (ref 65–105)
GLUCOSE URINE: NEGATIVE
HCT VFR BLD CALC: 24.9 % (ref 36.3–47.1)
HCT VFR BLD CALC: 25.5 % (ref 36.3–47.1)
HCT VFR BLD CALC: 25.8 % (ref 36.3–47.1)
HCT VFR BLD CALC: 26 % (ref 36.3–47.1)
HDLC SERPL-MCNC: 46 MG/DL
HEMOGLOBIN: 7.7 G/DL (ref 11.9–15.1)
HEMOGLOBIN: 7.9 G/DL (ref 11.9–15.1)
HEMOGLOBIN: 7.9 G/DL (ref 11.9–15.1)
HEMOGLOBIN: 8 G/DL (ref 11.9–15.1)
IMMATURE GRANULOCYTES: 2 %
IRON SATURATION: 15 % (ref 20–55)
IRON: 27 UG/DL (ref 37–145)
KETONES, URINE: NEGATIVE
LACTIC ACID, WHOLE BLOOD: NORMAL MMOL/L (ref 0.7–2.1)
LACTIC ACID: 0.9 MMOL/L (ref 0.5–2.2)
LDL CHOLESTEROL: 89 MG/DL (ref 0–130)
LEUKOCYTE ESTERASE, URINE: NEGATIVE
LIPASE: 30 U/L (ref 13–60)
LV EF: 65 %
LVEF MODALITY: NORMAL
LYMPHOCYTES # BLD: 5 % (ref 24–44)
MAGNESIUM: 2.3 MG/DL (ref 1.6–2.6)
MCH RBC QN AUTO: 27.3 PG (ref 25.2–33.5)
MCHC RBC AUTO-ENTMCNC: 30.6 G/DL (ref 28.4–34.8)
MCV RBC AUTO: 89.3 FL (ref 82.6–102.9)
MONOCYTES # BLD: 6 % (ref 1–7)
MORPHOLOGY: ABNORMAL
MORPHOLOGY: ABNORMAL
NITRITE, URINE: NEGATIVE
NRBC AUTOMATED: 2.4 PER 100 WBC
NUCLEATED RED BLOOD CELLS: 2 PER 100 WBC
PDW BLD-RTO: 16.8 % (ref 11.8–14.4)
PH UA: 6 (ref 5–8)
PLATELET # BLD: 190 K/UL (ref 138–453)
PLATELET ESTIMATE: ABNORMAL
PMV BLD AUTO: 11.7 FL (ref 8.1–13.5)
POTASSIUM SERPL-SCNC: 3.9 MMOL/L (ref 3.7–5.3)
PROCALCITONIN: 0.13 NG/ML
PROTEIN UA: NEGATIVE
RBC # BLD: 2.89 M/UL (ref 3.95–5.11)
RBC # BLD: ABNORMAL 10*6/UL
SEG NEUTROPHILS: 84 % (ref 36–66)
SEGMENTED NEUTROPHILS ABSOLUTE COUNT: 3.77 K/UL (ref 1.8–7.7)
SODIUM BLD-SCNC: 141 MMOL/L (ref 135–144)
SPECIFIC GRAVITY UA: 1.02 (ref 1–1.03)
TOTAL IRON BINDING CAPACITY: 183 UG/DL (ref 250–450)
TOTAL PROTEIN, URINE: 31 MG/DL
TOTAL PROTEIN: 6.5 G/DL (ref 6.4–8.3)
TRANSFUSION STATUS: NORMAL
TRANSFUSION STATUS: NORMAL
TRIGL SERPL-MCNC: 104 MG/DL
TURBIDITY: CLEAR
UNIT DIVISION: 0
UNIT DIVISION: 0
UNIT NUMBER: NORMAL
UNIT NUMBER: NORMAL
UNSATURATED IRON BINDING CAPACITY: 156 UG/DL (ref 112–347)
URINE HGB: NEGATIVE
UROBILINOGEN, URINE: NORMAL
VLDLC SERPL CALC-MCNC: NORMAL MG/DL (ref 1–30)
WBC # BLD: 4.5 K/UL (ref 3.5–11.3)
WBC # BLD: ABNORMAL 10*3/UL

## 2020-12-29 PROCEDURE — 36430 TRANSFUSION BLD/BLD COMPNT: CPT

## 2020-12-29 PROCEDURE — C9113 INJ PANTOPRAZOLE SODIUM, VIA: HCPCS | Performed by: NURSE PRACTITIONER

## 2020-12-29 PROCEDURE — 85025 COMPLETE CBC W/AUTO DIFF WBC: CPT

## 2020-12-29 PROCEDURE — 99232 SBSQ HOSP IP/OBS MODERATE 35: CPT | Performed by: INTERNAL MEDICINE

## 2020-12-29 PROCEDURE — 82150 ASSAY OF AMYLASE: CPT

## 2020-12-29 PROCEDURE — 82570 ASSAY OF URINE CREATININE: CPT

## 2020-12-29 PROCEDURE — 85384 FIBRINOGEN ACTIVITY: CPT

## 2020-12-29 PROCEDURE — 2060000000 HC ICU INTERMEDIATE R&B

## 2020-12-29 PROCEDURE — 84145 PROCALCITONIN (PCT): CPT

## 2020-12-29 PROCEDURE — 80061 LIPID PANEL: CPT

## 2020-12-29 PROCEDURE — 83516 IMMUNOASSAY NONANTIBODY: CPT

## 2020-12-29 PROCEDURE — 82390 ASSAY OF CERULOPLASMIN: CPT

## 2020-12-29 PROCEDURE — 83605 ASSAY OF LACTIC ACID: CPT

## 2020-12-29 PROCEDURE — 80053 COMPREHEN METABOLIC PANEL: CPT

## 2020-12-29 PROCEDURE — 85018 HEMOGLOBIN: CPT

## 2020-12-29 PROCEDURE — 84300 ASSAY OF URINE SODIUM: CPT

## 2020-12-29 PROCEDURE — 87205 SMEAR GRAM STAIN: CPT

## 2020-12-29 PROCEDURE — 85014 HEMATOCRIT: CPT

## 2020-12-29 PROCEDURE — 82947 ASSAY GLUCOSE BLOOD QUANT: CPT

## 2020-12-29 PROCEDURE — 82103 ALPHA-1-ANTITRYPSIN TOTAL: CPT

## 2020-12-29 PROCEDURE — 86255 FLUORESCENT ANTIBODY SCREEN: CPT

## 2020-12-29 PROCEDURE — 6370000000 HC RX 637 (ALT 250 FOR IP): Performed by: INTERNAL MEDICINE

## 2020-12-29 PROCEDURE — 86160 COMPLEMENT ANTIGEN: CPT

## 2020-12-29 PROCEDURE — 6370000000 HC RX 637 (ALT 250 FOR IP): Performed by: NURSE PRACTITIONER

## 2020-12-29 PROCEDURE — 36415 COLL VENOUS BLD VENIPUNCTURE: CPT

## 2020-12-29 PROCEDURE — 84156 ASSAY OF PROTEIN URINE: CPT

## 2020-12-29 PROCEDURE — 83735 ASSAY OF MAGNESIUM: CPT

## 2020-12-29 PROCEDURE — 94640 AIRWAY INHALATION TREATMENT: CPT

## 2020-12-29 PROCEDURE — 85379 FIBRIN DEGRADATION QUANT: CPT

## 2020-12-29 PROCEDURE — 6360000002 HC RX W HCPCS: Performed by: NURSE PRACTITIONER

## 2020-12-29 PROCEDURE — 86140 C-REACTIVE PROTEIN: CPT

## 2020-12-29 PROCEDURE — 99233 SBSQ HOSP IP/OBS HIGH 50: CPT | Performed by: INTERNAL MEDICINE

## 2020-12-29 PROCEDURE — 86038 ANTINUCLEAR ANTIBODIES: CPT

## 2020-12-29 PROCEDURE — 76770 US EXAM ABDO BACK WALL COMP: CPT

## 2020-12-29 PROCEDURE — 87522 HEPATITIS C REVRS TRNSCRPJ: CPT

## 2020-12-29 PROCEDURE — 83690 ASSAY OF LIPASE: CPT

## 2020-12-29 PROCEDURE — 93306 TTE W/DOPPLER COMPLETE: CPT

## 2020-12-29 PROCEDURE — 83550 IRON BINDING TEST: CPT

## 2020-12-29 PROCEDURE — 83540 ASSAY OF IRON: CPT

## 2020-12-29 PROCEDURE — 82105 ALPHA-FETOPROTEIN SERUM: CPT

## 2020-12-29 PROCEDURE — 81003 URINALYSIS AUTO W/O SCOPE: CPT

## 2020-12-29 PROCEDURE — 86256 FLUORESCENT ANTIBODY TITER: CPT

## 2020-12-29 PROCEDURE — 94761 N-INVAS EAR/PLS OXIMETRY MLT: CPT

## 2020-12-29 PROCEDURE — 2700000000 HC OXYGEN THERAPY PER DAY

## 2020-12-29 RX ORDER — ERGOCALCIFEROL 1.25 MG/1
50000 CAPSULE ORAL WEEKLY
COMMUNITY

## 2020-12-29 RX ORDER — DONEPEZIL HYDROCHLORIDE 10 MG/1
10 TABLET, FILM COATED ORAL NIGHTLY
COMMUNITY

## 2020-12-29 RX ORDER — PANTOPRAZOLE SODIUM 40 MG/1
40 TABLET, DELAYED RELEASE ORAL 2 TIMES DAILY
COMMUNITY

## 2020-12-29 RX ORDER — MIRTAZAPINE 15 MG/1
15 TABLET, FILM COATED ORAL NIGHTLY
COMMUNITY

## 2020-12-29 RX ORDER — MONTELUKAST SODIUM 10 MG/1
10 TABLET ORAL NIGHTLY
COMMUNITY

## 2020-12-29 RX ORDER — BUMETANIDE 2 MG/1
2 TABLET ORAL DAILY
COMMUNITY

## 2020-12-29 RX ORDER — MEMANTINE HYDROCHLORIDE 14 MG/1
14 CAPSULE, EXTENDED RELEASE ORAL DAILY
COMMUNITY

## 2020-12-29 RX ADMIN — PANTOPRAZOLE SODIUM 40 MG: 40 INJECTION, POWDER, FOR SOLUTION INTRAVENOUS at 08:24

## 2020-12-29 RX ADMIN — SUCRALFATE 1 G: 1 TABLET ORAL at 17:33

## 2020-12-29 RX ADMIN — IPRATROPIUM BROMIDE 2 PUFF: 17 AEROSOL, METERED RESPIRATORY (INHALATION) at 21:19

## 2020-12-29 RX ADMIN — FLUTICASONE PROPIONATE 1 SPRAY: 50 SPRAY, METERED NASAL at 08:13

## 2020-12-29 RX ADMIN — MAGNESIUM GLUCONATE 500 MG ORAL TABLET 400 MG: 500 TABLET ORAL at 14:07

## 2020-12-29 RX ADMIN — FERROUS SULFATE TAB EC 325 MG (65 MG FE EQUIVALENT) 325 MG: 325 (65 FE) TABLET DELAYED RESPONSE at 08:24

## 2020-12-29 RX ADMIN — INSULIN LISPRO 1 UNITS: 100 INJECTION, SOLUTION INTRAVENOUS; SUBCUTANEOUS at 21:56

## 2020-12-29 RX ADMIN — FLUTICASONE PROPIONATE 1 SPRAY: 50 SPRAY, METERED NASAL at 20:51

## 2020-12-29 RX ADMIN — ISOSORBIDE DINITRATE 10 MG: 10 TABLET ORAL at 14:07

## 2020-12-29 RX ADMIN — ISOSORBIDE DINITRATE 10 MG: 10 TABLET ORAL at 20:51

## 2020-12-29 RX ADMIN — AMLODIPINE BESYLATE 10 MG: 5 TABLET ORAL at 08:24

## 2020-12-29 RX ADMIN — CARVEDILOL 3.12 MG: 3.12 TABLET, FILM COATED ORAL at 17:33

## 2020-12-29 RX ADMIN — FUROSEMIDE 40 MG: 40 TABLET ORAL at 08:23

## 2020-12-29 RX ADMIN — SUCRALFATE 1 G: 1 TABLET ORAL at 23:19

## 2020-12-29 RX ADMIN — IPRATROPIUM BROMIDE 2 PUFF: 17 AEROSOL, METERED RESPIRATORY (INHALATION) at 11:29

## 2020-12-29 RX ADMIN — IPRATROPIUM BROMIDE 2 PUFF: 17 AEROSOL, METERED RESPIRATORY (INHALATION) at 08:13

## 2020-12-29 RX ADMIN — ALBUTEROL SULFATE 2 PUFF: 90 AEROSOL, METERED RESPIRATORY (INHALATION) at 16:25

## 2020-12-29 RX ADMIN — SUCRALFATE 1 G: 1 TABLET ORAL at 14:08

## 2020-12-29 RX ADMIN — PANTOPRAZOLE SODIUM 40 MG: 40 INJECTION, POWDER, FOR SOLUTION INTRAVENOUS at 21:52

## 2020-12-29 RX ADMIN — ALBUTEROL SULFATE 2 PUFF: 90 AEROSOL, METERED RESPIRATORY (INHALATION) at 11:29

## 2020-12-29 RX ADMIN — ISOSORBIDE DINITRATE 10 MG: 10 TABLET ORAL at 08:22

## 2020-12-29 RX ADMIN — ATORVASTATIN CALCIUM 10 MG: 10 TABLET, FILM COATED ORAL at 20:51

## 2020-12-29 RX ADMIN — INSULIN LISPRO 1 UNITS: 100 INJECTION, SOLUTION INTRAVENOUS; SUBCUTANEOUS at 14:08

## 2020-12-29 RX ADMIN — CARVEDILOL 3.12 MG: 3.12 TABLET, FILM COATED ORAL at 08:24

## 2020-12-29 RX ADMIN — FOLIC ACID 1 MG: 1 TABLET ORAL at 08:24

## 2020-12-29 RX ADMIN — DEXAMETHASONE 6 MG: 4 TABLET ORAL at 08:23

## 2020-12-29 RX ADMIN — MAGNESIUM GLUCONATE 500 MG ORAL TABLET 400 MG: 500 TABLET ORAL at 20:51

## 2020-12-29 RX ADMIN — INSULIN LISPRO 1 UNITS: 100 INJECTION, SOLUTION INTRAVENOUS; SUBCUTANEOUS at 17:34

## 2020-12-29 RX ADMIN — IPRATROPIUM BROMIDE 2 PUFF: 17 AEROSOL, METERED RESPIRATORY (INHALATION) at 16:25

## 2020-12-29 RX ADMIN — ALBUTEROL SULFATE 2 PUFF: 90 AEROSOL, METERED RESPIRATORY (INHALATION) at 08:47

## 2020-12-29 RX ADMIN — ALBUTEROL SULFATE 2 PUFF: 90 AEROSOL, METERED RESPIRATORY (INHALATION) at 21:19

## 2020-12-29 ASSESSMENT — PAIN SCALES - GENERAL
PAINLEVEL_OUTOF10: 0
PAINLEVEL_OUTOF10: 3

## 2020-12-29 ASSESSMENT — PAIN DESCRIPTION - DESCRIPTORS: DESCRIPTORS: CONSTANT;CRAMPING;PRESSURE

## 2020-12-29 ASSESSMENT — PAIN DESCRIPTION - FREQUENCY: FREQUENCY: INTERMITTENT

## 2020-12-29 NOTE — ED NOTES
Spoke with Brook Sanches MD, suarez placement.  Bladder scanned, 414 ml      Brianda ASHLEY Palacio  12/29/20 6405

## 2020-12-29 NOTE — CONSULTS
Gastroenterology Consult Note      Patient: Lorena Santana  : 1945  Acct#:  [de-identified]     Date:  2020    Subjective:       History of Present Illness  Patient is a 66 y.o.  female admitted with Pneumonia due to COVID-19 virus [U07.1, J12.89] who is seen in consult for *abdominal pain  Elderly lady positive Covid for which interview was not done well face-to-face, information taken from the nursing staff by phone and from my nurse practitioner  This lady came with abdominal pain and shortness of breath, had recent Covid exposure  We are consulted because she is anemic with a hemoglobin is down to 6 g getting 1 unit of blood right now repeat hemoglobin up to 7.3  No black stool blood in the stool or hematemesis  Patient had a chronic anemia with hemoglobin running around 8 g  Denied any GI symptoms no odynophagia dysphagia no diarrhea no abdominal pain      Had a colonoscopy in 2020 and [facility]: Polyps and diverticulosis and hemorrhoids  EGD in May 2020: Gastritis linear delusions  And grade D esophagitis  White blood count is normal platelet count is normal  Elevated liver enzymes with an ALT of 52 AST of 82 normal alkaline phosphatase  Elevated BUN/creatinine right now with a creatinine of 1.7  CT scan of the abdomen is ordered and still pending  As mentioned the patient does have chronic anemia and she had a bone marrow done in 2020    Past Medical History:   Diagnosis Date    Asthma     CHF (congestive heart failure) ([address]     Chronic obstructive pulmonary disease with acute exacerbation [facility]     Diabetes mellitus ([address]     Gastroesophageal reflux disease without esophagitis     Hep C w/o coma, chronic ([address]     PAF (paroxysmal atrial fibrillation) ([address]     Systolic and diastolic CHF, chronic ([address]       Past Surgical History:   Procedure Laterality Date    A-V CARDIAC PACEMAKER INSERTION      ATRIAL ABLATION SURGERY      COLONOSCOPY    PACEMAKER INSERTION      TONSILLECTOMY      UPPER GASTROINTESTINAL ENDOSCOPY  2020      Past Endoscopic History as above    Admission Meds  No current facility-administered medications on file prior to encounter.       Current Outpatient Medications on File Prior to Encounter   Medication Sig Dispense Refill    carvedilol (COREG) 25 MG tablet Take 25 mg by mouth 2 times daily (with meals)      Acetaminophen 500 MG CAPS Take 100 mg by mouth 2 times daily      albuterol sulfate  (90 Base) MCG/ACT inhaler Inhale 2 puffs into the lungs daily      alendronate (FOSAMAX) 70 MG tablet Take 70 mg by mouth every 7 days      amLODIPine (NORVASC) 10 MG tablet Take 10 mg by mouth daily      aspirin 81 MG tablet Take 81 mg by mouth daily      atorvastatin (LIPITOR) 10 MG tablet Take 10 mg by mouth nightly      B Complex-Folic Acid (B COMPLEX-VITAMIN B12 PO) Take 1 tablet by mouth as needed      bisacodyl (DULCOLAX) 5 MG EC tablet Take 5 mg by mouth as needed      ferrous sulfate 325 (65 Fe) MG tablet Take 325 Doses by mouth daily      fexofenadine (ALLEGRA) 180 MG tablet Take 180 mg by mouth daily      fluticasone (FLONASE) 50 MCG/ACT nasal spray 1 spray by Nasal route 2 times daily      folic acid (FOLVITE) 1 MG tablet Take 1 tablet by mouth      furosemide (LASIX) 40 MG tablet Take 40 mg by mouth daily      guaiFENesin (MUCINEX) 600 MG extended release tablet Take 1,200 mg by mouth as needed      hydrALAZINE (APRESOLINE) 50 MG tablet Take 50 mg by mouth      ipratropium (ATROVENT) 0.02 % nebulizer solution Inhale 0.5 mg into the lungs as needed      metFORMIN (GLUCOPHAGE) 500 MG tablet Take 500 mg by mouth 2 times daily      mexiletine (MEXITIL) 150 MG capsule Take 150 mg by mouth daily      omeprazole (PRILOSEC OTC) 20 MG tablet Take 40 mg by mouth 2 times daily      potassium chloride (KLOR-CON M) 20 MEQ extended release tablet Take 20 mEq by mouth 3 times daily  probenecid (BENEMID) 500 MG tablet Take 500 mg by mouth daily      carvedilol (COREG) 25 MG tablet Take 25 mg by mouth 2 times daily      Magnesium Oxide 500 MG CAPS Take 500 mg by mouth 3 times daily      cloNIDine (CATAPRES) 0.3 MG/24HR Place 1 patch onto the skin continuous      diclofenac sodium 1 % GEL Apply 2 g topically as needed      isosorbide dinitrate (ISORDIL) 10 MG tablet Take 10 mg by mouth 3 times daily      NONFORMULARY Take 75 mg by mouth 2 times daily Indications: [employer]         Patient   Does Use ASA, NSAID No  Allergies  Allergies   Allergen Reactions    Codeine     Lisinopril     Percocet [Oxycodone-Acetaminophen]         Social   Social History     Tobacco Use    Smoking status: Former Smoker    Smokeless tobacco: Never Used   Substance Use Topics    Alcohol use: No        PSYCH HISTORY:  Depression No  Anxiety No  Suicide No       Family History   Problem Relation Age of Onset    Diabetes Mother     Diabetes Father       No family history of colon cancer, Crohn's disease, or ulcerative colitis. Review of Systems  Constitutional: negative  Eyes: negative  Ears, nose, mouth, throat, and face: negative  Respiratory: negative  Cardiovascular: negative  Gastrointestinal: negative  Genitourinary:negative  Integument/breast: negative  Hematologic/lymphatic: negative  Musculoskeletal:negative  Endocrine: negative           Physical Exam  Blood pressure (!) 153/57, pulse 63, temperature 98.1 °F (36.7 °C), resp. rate 20, height 5' 6\" (1.676 m), weight 190 lb (86.2 kg), SpO2 94 %.          General Appearance: alert and oriented to person, place and time, well-developed and well-nourished, in no acute distress  Skin: warm and dry, no rash or erythema  Head: normocephalic and atraumatic  Eyes: pupils equal, round, and reactive to light, extraocular eye movements intact, conjunctivae normal  ENT: hearing grossly normal bilaterally Alzheimer's disease ([facility] [address]    Asthma    Benign hypertension    Cardiac pacemaker in situ    Chronic diastolic heart failure (HCC)    COPD (chronic obstructive pulmonary disease) ([facility] [address]    History of cardiac arrest    Hypercholesterolemia    Long term current use of anticoagulant therapy    Presence of automatic implantable cardioverter-defibrillator    Ventricular tachycardia, polymorphic ([facility] [address]  Resolved Problems:    * No resolved hospital problems. *    Covid positive patient  Anemia chronic exacerbated today  No sign of brisk bleeding  Elevated liver enzyme most likely related to Covid    Recommendations:   Transfuse  H&H monitoring  PPI  Will need EGD colonoscopy as an outpatient  Call if there is sign of brisk bleeding  Trend the liver enzymes daily, and will wait for the CAT scan to see if there is any abnormality  We might initiate liver disease work-up if the liver enzymes stays up                      Thank you for allowing me to participate in the care of your patient. Please feel free to contact me with any questions or concerns.      Neisha Robledo MD

## 2020-12-29 NOTE — PROGRESS NOTES
Renal Progress Note    Patient :  Ailyn Bradley; 66 y.o. MRN# [id number]  Location:  1106/1106-01  Attending:  Letitia Vo MD  Admit Date:  12/28/2020   Hospital Day: 1      Subjective:     Urine output good, although not being measured. Had post void residual about 414 was able to double void subsequently. Daniels catheter thereafter not placed. Received 2 units of packed cells yesterday. CT scan of the abdomen reviewed, showed some evidence of inflammation around the pancreas. Also showed groundglass opacities in the lung bases consistent with COVID-19 pneumonia. Creatinine down to 0.9. Hemodynamically stable. Continues on home dose of loop diuretics. Blood sugars well controlled. Convalescent plasma has been ordered. Feels much better this morning, no shortness of breath orthopnea. Hemodynamically stable.     Outpatient Medications:     Medications Prior to Admission: carvedilol (COREG) 25 MG tablet, Take 25 mg by mouth 2 times daily (with meals)  Acetaminophen 500 MG CAPS, Take 100 mg by mouth 2 times daily  albuterol sulfate  (90 Base) MCG/ACT inhaler, Inhale 2 puffs into the lungs daily  alendronate (FOSAMAX) 70 MG tablet, Take 70 mg by mouth every 7 days  amLODIPine (NORVASC) 10 MG tablet, Take 10 mg by mouth daily  aspirin 81 MG tablet, Take 81 mg by mouth daily  atorvastatin (LIPITOR) 10 MG tablet, Take 10 mg by mouth nightly  B Complex-Folic Acid (B COMPLEX-VITAMIN B12 PO), Take 1 tablet by mouth as needed  bisacodyl (DULCOLAX) 5 MG EC tablet, Take 5 mg by mouth as needed  ferrous sulfate 325 (65 Fe) MG tablet, Take 325 Doses by mouth daily  fexofenadine (ALLEGRA) 180 MG tablet, Take 180 mg by mouth daily  fluticasone (FLONASE) 50 MCG/ACT nasal spray, 1 spray by Nasal route 2 times daily  folic acid (FOLVITE) 1 MG tablet, Take 1 tablet by mouth  furosemide (LASIX) 40 MG tablet, Take 40 mg by mouth daily guaiFENesin (MUCINEX) 600 MG extended release tablet, Take 1,200 mg by mouth as needed  hydrALAZINE (APRESOLINE) 50 MG tablet, Take 50 mg by mouth  ipratropium (ATROVENT) 0.02 % nebulizer solution, Inhale 0.5 mg into the lungs as needed  metFORMIN (GLUCOPHAGE) 500 MG tablet, Take 500 mg by mouth 2 times daily  mexiletine (MEXITIL) 150 MG capsule, Take 150 mg by mouth daily  omeprazole (PRILOSEC OTC) 20 MG tablet, Take 40 mg by mouth 2 times daily  potassium chloride (KLOR-CON M) 20 MEQ extended release tablet, Take 20 mEq by mouth 3 times daily  probenecid (BENEMID) 500 MG tablet, Take 500 mg by mouth daily  carvedilol (COREG) 25 MG tablet, Take 25 mg by mouth 2 times daily  Magnesium Oxide 500 MG CAPS, Take 500 mg by mouth 3 times daily  cloNIDine (CATAPRES) 0.3 MG/24HR, Place 1 patch onto the skin continuous  diclofenac sodium 1 % GEL, Apply 2 g topically as needed  isosorbide dinitrate (ISORDIL) 10 MG tablet, Take 10 mg by mouth 3 times daily  NONFORMULARY, Take 75 mg by mouth 2 times daily Indications: [employer]    Current Medications:     Scheduled Meds:    ferrous sulfate  325 mg Oral Daily with breakfast    atorvastatin  10 mg Oral Nightly    Magnesium Oxide  500 mg Oral TID    folic acid  1 mg Oral Daily    isosorbide dinitrate  10 mg Oral TID    dexamethasone  6 mg Oral Daily    amLODIPine  10 mg Oral Daily    [Held by provider] cloNIDine  0.1 mg Oral BID    fluticasone  1 spray Nasal BID    insulin lispro  0-6 Units Subcutaneous TID WC    insulin lispro  0-3 Units Subcutaneous Nightly    pantoprazole  40 mg Intravenous BID    ipratropium  2 puff Inhalation 4x Daily    furosemide  40 mg Oral Daily    carvedilol  3.125 mg Oral BID WC    sucralfate  1 g Oral 4 times per day     Continuous Infusions:    dextrose       PRN Meds:  acetaminophen **OR** acetaminophen, glucose, dextrose, glucagon (rDNA), dextrose, albuterol sulfate HFA    Input/Output:       No intake/output data recorded. Stephan Morrison Patient Vitals for the past 96 hrs (Last 3 readings):   Weight   20 1216 190 lb (86.2 kg)       Vital Signs:   Temperature:  Temp: 98.1 °F (36.7 °C)  TMax:   Temp (24hrs), Av.4 °F (36.9 °C), Min:97.6 °F (36.4 °C), Max:99.1 °F (37.3 °C)    Respirations:  Resp: 18  Pulse:   Pulse: 60  BP:    BP: (!) 133/56  BP Range: Systolic (32VXU), FRH:924 , Min:130 , QQZ:895       Diastolic (59NVV), INJ:45, Min:39, Max:60      Physical Examination:     General:  AAO x 3, speaking in full sentences, no accessory muscle use. HEENT: Atraumatic, normocephalic, no throat congestion, moist mucosa. Eyes:   Pupils equal, round and reactive to light, EOMI. Neck:   No JVD, no thyromegaly, no lymphadenopathy. Chest:  Bilateral vesicular breath sounds, no rales or wheezes. Cardiac:  S1 S2 RR, no murmurs, gallops or rubs, JVP not raised. Abdomen: Soft, non-tender, no masses or organomegaly, BS audible. :   No suprapubic or flank tenderness. Neuro:  AAO x 3, No FND. SKIN:  No rashes, good skin turgor. Extremities:  No edema, palpable peripheral pulses, no calf tenderness. Labs:       Recent Labs     20  1230 20  1710 20  0033 20  0617   WBC 5.8 5.5  --  4.5   RBC 2.27* 2.68*  --  2.89*   HGB 6.0* 7.3* 7.7* 7.9*   HCT 20.5* 24.2* 24.9* 25.8*   MCV 90.3 90.3  --  89.3   MCH 26.4 27.2  --  27.3   MCHC 29.3 30.2  --  30.6   RDW 17.8* 16.9*  --  16.8*    204  --  190   MPV 11.3 11.9  --  11.7      BMP:   Recent Labs     20  1230 20  1457 20  1710 20  0617     --  139 141   K 4.1  --  4.1 3.9     --  108* 107   CO2 23  --  22 23   BUN 29*  --  25* 18   CREATININE 1.72*  --  1.26* 0.99*   GLUCOSE 241* 148 157* 140*   CALCIUM 8.1*  --  7.7* 8.0*      Phosphorus:   No results for input(s): PHOS in the last 72 hours.   Magnesium:    Recent Labs     20  0617   MG 2.3     Albumin:    Recent Labs     20  1710 20  0617   LABALBU 3.0* 2.7* BNP:      Lab Results   Component Value Date    BNP 1,141 08/10/2012     DARCI:      Lab Results   Component Value Date    DARCI NEGATIVE 05/27/2020     SPEP:  Lab Results   Component Value Date    PROT 6.5 12/29/2020    ALBCAL PENDING 12/28/2020    ALBPCT PENDING 12/28/2020    LABALPH PENDING 12/28/2020    LABALPH PENDING 12/28/2020    A1PCT PENDING 12/28/2020    A2PCT PENDING 12/28/2020    LABBETA PENDING 12/28/2020    BETAPCT PENDING 12/28/2020    GAMGLOB PENDING 12/28/2020    GGPCT PENDING 12/28/2020    PATH PENDING 12/28/2020     UPEP:   No results found for: LABPE  C3:     Lab Results   Component Value Date    C3 115 12/28/2020     C4:     Lab Results   Component Value Date    C4 26 12/28/2020     MPO ANCA:   No results found for: MPO  PR3 ANCA:   No results found for: PR3  Anti-GBM:   No results found for: GBMABIGG  Hep BsAg:         Lab Results   Component Value Date    HEPBSAG NONREACTIVE 02/11/2016     Hep C AB:          Lab Results   Component Value Date    HEPCAB REACTIVE 02/11/2016       Urinalysis/Chemistries:      Lab Results   Component Value Date    NITRU NEGATIVE 12/29/2020    COLORU YELLOW 12/29/2020    PHUR 6.0 12/29/2020    WBCUA 5 TO 10 04/09/2016    RBCUA 0 TO 2 04/09/2016    MUCUS NOT REPORTED 04/09/2016    TRICHOMONAS NOT REPORTED 04/09/2016    YEAST NOT REPORTED 04/09/2016    BACTERIA NOT REPORTED 04/09/2016    SPECGRAV 1.020 12/29/2020    LEUKOCYTESUR NEGATIVE 12/29/2020    UROBILINOGEN Normal 12/29/2020    BILIRUBINUR NEGATIVE 12/29/2020    BILIRUBINUR NEGATIVE 04/22/2012    GLUCOSEU NEGATIVE 12/29/2020    GLUCOSEU NEGATIVE 04/22/2012    KETUA NEGATIVE 12/29/2020    AMORPHOUS NOT REPORTED 04/09/2016     Urine Sodium:   No results found for: TALYA  Urine Potassium:  No results found for: KUR  Urine Chloride:  No results found for: CLUR  Urine Osmolarity: No results found for: OSMOU  Urine Protein:   No components found for: TOTALPROTEIN, URINE   Urine Creatinine:   No results found for: LABCREA Urine Eosinophils:  No components found for: UEOS    Radiology:     CXR:     Assessment:     1. Acute Kidney Injury: Secondary to ischemic ATN from acute anemia or drop in hemoglobin, systemic intermittent response and incomplete COVID-19 pneumonia, volume depletion from decreased intake, baseline creatinine 1.11.2 peaked up to 1.8 now down to 0.9 with restoration of blood volume and blood transfusions  2. Chronic kidney disease stage III from diabetes baseline 1.11.2  3. COVID-19 pneumonia without overt respiratory failure  4. Acute anemia with symptoms  5. Suspected pancreatitis based on imaging studies  6. Type 2 diabetes with complications  7. COPD  8. IgG kappa monoclonal gammopathy of unknown significance    Plan:   1. Continue home dose of Lasix  2. Check postvoid residual and Place Daniels if more than 150 mL  3. Check urine protein creatinine ratio, complement levels  4. Discontinue clonidine for now  5. Keep systolic pressure more than 110  6. Check amylase lipase levels  7. We will follow with you    Nutrition   Please ensure that patient is on a renal diet/TF. Avoid nephrotoxic drugs/contrast exposure. We will continue to follow along with you.

## 2020-12-29 NOTE — PROGRESS NOTES
Patient arrived to ICU room 1106 via stretcher. Patient moved to bed with 1 person assist. Patient is alert and oriented x 4 and hooked up to monitor. Patient is resting comfortably on stretcher with bed in the lowest position, 2/4 side rails up, and call light at bedside.  Patient is currently on 3 L NC.

## 2020-12-29 NOTE — ED NOTES
RN spoke with Dr Abrahan Orozco for cardiology consult.  Added to list.      Joon Fortune, RN  12/28/20 7971

## 2020-12-29 NOTE — PLAN OF CARE
Problem: Skin Integrity:  Goal: Will show no infection signs and symptoms  Description: Will show no infection signs and symptoms  Outcome: Ongoing  Goal: Absence of new skin breakdown  Description: Absence of new skin breakdown  Outcome: Ongoing     Problem: Airway Clearance - Ineffective  Goal: Achieve or maintain patent airway  Outcome: Ongoing     Problem: Gas Exchange - Impaired  Goal: Absence of hypoxia  Outcome: Ongoing  Goal: Promote optimal lung function  Outcome: Ongoing     Problem: Breathing Pattern - Ineffective  Goal: Ability to achieve and maintain a regular respiratory rate  Outcome: Ongoing     Problem:  Body Temperature -  Risk of, Imbalanced  Goal: Ability to maintain a body temperature within defined limits  Outcome: Ongoing  Goal: Will regain or maintain usual level of consciousness  Outcome: Ongoing  Goal: Complications related to the disease process, condition or treatment will be avoided or minimized  Outcome: Ongoing     Problem: Isolation Precautions - Risk of Spread of Infection  Goal: Prevent transmission of infection  Outcome: Ongoing     Problem: Nutrition Deficits  Goal: Optimize nutrtional status  Outcome: Ongoing     Problem: Risk for Fluid Volume Deficit  Goal: Maintain normal heart rhythm  Outcome: Ongoing  Goal: Maintain absence of muscle cramping  Outcome: Ongoing  Goal: Maintain normal serum potassium, sodium, calcium, phosphorus, and pH  Outcome: Ongoing     Problem: Loneliness or Risk for Loneliness  Goal: Demonstrate positive use of time alone when socialization is not possible  Outcome: Ongoing     Problem: Fatigue  Goal: Verbalize increase energy and improved vitality  Outcome: Ongoing     Problem: Patient Education: Go to Patient Education Activity  Goal: Patient/Family Education  Outcome: Ongoing     Problem: Falls - Risk of:  Goal: Will remain free from falls  Description: Will remain free from falls  Outcome: Ongoing  Goal: Absence of physical injury Description: Absence of physical injury  Outcome: Ongoing

## 2020-12-29 NOTE — ED NOTES
RN spoke to lab regarding order for covid plasma. Lab states that order is received and will possible arrive in 1-2 days.       Jimbo Martin RN  12/29/20 0115 No

## 2020-12-29 NOTE — FLOWSHEET NOTE
Patient in COVID-19 isolation; sleeping; no family present. Writer prays for patient in hallway. Spiritual Care will follow as needed.      12/29/20 1229   Encounter Summary   Services provided to: Patient not available   Referral/Consult From: [place]   Continue Visiting   (12/29/20 sleeping)   Complexity of Encounter Low   Length of Encounter 15 minutes   Routine   Type Initial   Assessment Unable to respond

## 2020-12-29 NOTE — CARE COORDINATION
Case Management Initial Discharge Plan  Mitzy Lopes,             Met with:patient or spouse/SO to discuss discharge plans. Information verified: address, contacts, phone number, , insurance Yes    Emergency Contact/Next of Kin name & number: eamon/spouse   [phone]    PCP: No primary care provider on file. Date of last visit: Dr. Sherryle Darnel - few months ago    Insurance Provider: medicare    Discharge Planning    Living Arrangements:  Spouse/Significant Other, Other (Comment)(grandson)   Support Systems:  Spouse/Significant Other, Children, [employer], Family Members    Home has 1 stories  1 stairs to climb to get into front door, 0stairs to climb to reach second floor  Location of bedroom/bathroom in home main    Patient able to perform ADL's:Independent    Current Services (outpatient & in home) none  DME equipment: cane  DME provider: -    Receiving oral anticoagulation therapy? No    If indicated:   Physician managing anticoagulation treatment: -  Where does patient obtain lab work for ATC treatment? -      Potential Assistance Needed:       Patient agreeable to home care: Yes  Freedom of choice provided:  yes    Prior SNF/Rehab Placement and Facility: SNF name unknown  Agreeable to SNF/Rehab: No  [place] of choice provided: n/a     Evaluation: no    Expected Discharge date:  21    Patient expects to be discharged to:  private residence  Follow Up Appointment: Best Day/ Time:      Transportation provider: family  Transportation arrangements needed for discharge: No    Readmission Risk              Risk of Unplanned Readmission:        20             Does patient have a readmission risk score greater than 14?: Yes  If yes, follow-up appointment must be made within 7 days of discharge.      Goals of Care:       Discharge Plan: Dg: Pneumonia d/t COVID+  Patient lives at home in a 1 story house with spouse  Uses a cane with ambulation  Has had HC years ago Has been to SNF but does not remember which one  PCP is Dr. Ermelinda Krabbe is [facility] on [address]  Has transportation home. Consults: PT/OT                   Nephrology                    GI                     Cardiology  Patient is on 3L NC. Will continue to follow.   Plan is Home care and possible Home O2          Electronically signed by Meghna Inman RN on 12/29/20 at 1:59 PM EST

## 2020-12-29 NOTE — PROGRESS NOTES
[facility] [place] [facility]   Progress Note    Andra Abdullahi is a 66 y.o. female patient. Hospitalization Day:1      Chief consult reason:   Abdominal pain  Anemia  Subjective:  Staff states pt denies any abdominal pain, nausea or vomiting. Had brown stool overnight  CT A/P showed PNA consistent with Covid, bilateral pleural effusions Retroperitoneal induration about the pancreas raising the possibility for acute pancreatitis. Dr. Andrei Schwarz reviewed imaging and not convinced this is real. Lipase and amylase within normal limits, pt's abdominal pain has resolved  Transaminase still elevated    VITALS:  BP (!) 133/56   Pulse 60   Temp 98.1 °F (36.7 °C)   Resp 18   Ht 5' 6\" (1.676 m)   Wt 190 lb (86.2 kg)   SpO2 100%   BMI 30.67 kg/m²   TEMPERATURE:  Current - Temp: 98.1 °F (36.7 °C); Max - Temp  Av.4 °F (36.9 °C)  Min: 97.6 °F (36.4 °C)  Max: 99.1 °F (37.3 °C)    Physical Assessment:  Defered to primary as pt is in COVID isolation    Data Review:    Labs and Imaging:     CBC:  Recent Labs     20  1230 20  1710 20  0033 20  0617   WBC 5.8 5.5  --  4.5   HGB 6.0* 7.3* 7.7* 7.9*   MCV 90.3 90.3  --  89.3   RDW 17.8* 16.9*  --  16.8*    204  --  190       ANEMIA STUDIES:  Recent Labs     20  1230   FERRITIN 153*       BMP:  Recent Labs     20  1230 20  1457 20  1710 20  0617     --  139 141   K 4.1  --  4.1 3.9     --  108* 107   CO2 23  --  22 23   BUN 29*  --  25* 18   CREATININE 1.72*  --  1.26* 0.99*   GLUCOSE 241* 148 157* 140*   CALCIUM 8.1*  --  7.7* 8.0*       LFTS:  Recent Labs     20  1710 20  0617   ALKPHOS 63 72   ALT 52* 67*   AST 82* 81*   BILITOT 0.44 0.42   LABALBU 3.0* 2.7*       Amylase/Lipase and Ammonia:  No results for input(s): AMYLASE, LIPASE, AMMONIA in the last 72 hours.     Acute Hepatitis Panel:  Lab Results   Component Value Date    HEPBSAG NONREACTIVE 2016    HEPCAB REACTIVE 2016 HEPBIGM NONREACTIVE 02/11/2016    HEPAIGM NONREACTIVE 02/11/2016       HCV Genotype:  No results found for: HEPATITISCGENOTYPE    HCV Quantitative:  No results found for: HCVQNT    LIVER WORK UP:    AFP  No results found for: AFP    Alpha 1 antitrypsin   No results found for: A1A    DARCI  Lab Results   Component Value Date    DARCI NEGATIVE 05/27/2020       AMA  No results found for: MITOAB    ASMA  No results found for: SMOOTHMUSCAB    PT/INR  No results for input(s): PROTIME, INR in the last 72 hours. Cancer Markers:  CEA:  No results for input(s): CEA in the last 72 hours. Ca 125:  No results for input(s):  in the last 72 hours. Ca 19-9:   Invalid input(s):   AFP: No results for input(s): AFP in the last 72 hours. Lactic acid:Invalid input(s): LACTIC ACID    Radiology Review:    12/28/2020 CT scan A/P     FINDINGS:   Lower Chest: Small bilateral pleural effusions.  Multifocal patchy   ground-glass opacities in the lung bases.  No pericardial effusion.  Coronary   artery disease.       Organs: Evaluation of the solid organs is compromised by lack of IV contrast.   The unenhanced liver, spleen, adrenal glands, and kidneys are grossly   unremarkable.  Retroperitoneal induration about the pancreatic body.  The   gallbladder is surgically absent.       GI/Bowel: The stomach and small bowel loops are unremarkable.  No small bowel   obstruction.  Normal appendix.  The colon is intact.  Stool present   throughout the colon.       Pelvis: The bladder is fluid filled and is unremarkable.  Uterus is present.       Peritoneum/Retroperitoneum: Aortoiliac atherosclerotic disease without   aneurysm.  No free fluid or free air in the abdomen.  Small amount of free   fluid in the pelvis.       Bones/Soft Tissues: Diffuse body wall edema.  Otherwise, soft tissues are   unremarkable.  No destructive osseous lesions.           Impression   1.  Patchy ground-glass opacities in the lung bases compatible with atypical/viral pneumonia, and consistent with history of COVID-19 pneumonia. 2. Small bilateral pleural effusions and diffuse body wall edema.  Findings   compatible with CHF. 3. Retroperitoneal induration about the pancreas raising the possibility for   acute pancreatitis. 4. Normal appendix. 5. Stool throughout the colon in keeping with constipation. Principal Problem:    Anemia  Active Problems:    Type 2 diabetes mellitus with hyperglycemia (HCC)    Coronary artery disease involving native coronary artery of native heart without angina pectoris    Acute kidney injury ([facility]    COVID-19    Alzheimer's disease ([facility]    Asthma    Benign hypertension    Cardiac pacemaker in situ    Chronic diastolic heart failure (HCC)    COPD (chronic obstructive pulmonary disease) ([facility]    History of cardiac arrest    Hypercholesterolemia    Long term current use of anticoagulant therapy    Presence of automatic implantable cardioverter-defibrillator    Ventricular tachycardia, polymorphic ([facility]  Resolved Problems:    * No resolved hospital problems. *       GI Impression:    1. Anemia-stable. No active bleeding. 2. Abdominal pain-resolved  3. Elevated Transaminase-pt need complete liver work up given history of Hep C  4. Hx of Hep C      Plan and Recommendations:    1. No indications for endoscopy at this time. Hgb has stabilized. Pt had brown BM last night  2. Will order liver serologies, Liver US, and AFP  3. Pt will need to have MRI/MRCP as OP once cleared of COVID  4. Cont PPI IV BID, Carafate  5. Monitor for active bleeding  6. Daily labs  7. Will follow      This plan was formulated in collaboration with Dr. Katie Guillen MD    Thank you for allowing me to participate in the care of your patient. Please feel free to contact me with any questions or concerns.      CHRIS [facility]    Attending Physician Statement  I have discussed the care of Oliver Escobedo and I have examined the patient myselft independently, and taken ros and hpi , including pertinent history and exam findings,  with the author of this note . I have reviewed the key elements of all parts of the encounter with the nurse practitioner/resident.     I agree with the assessment, plan and orders as documented by the above health care provider       No sign of bleeding and hemoglobin is stable  As far as the liver enzymes the patient did have hepatitis C  We will get an ultrasound we will get an alpha-fetoprotein  Will need an MRI MRCP as an outpatient  Will need an outpatient follow-up also  Will make sure she does not have any other liver diseases  Electronically signed by Sriram Randolph MD

## 2020-12-29 NOTE — CONSULTS
Transitions of Care Pharmacy Service   Medication Review    The patient's list of current home medications has been reviewed. Source(s) of information: [facility] (cardiology, gastro), [facility], [facility]    Other Notes · She is prescribed Xarelto 20mg daily for Afib by cardiology (recently re-prescribed on 12/3/20), but she is noncompliant with this med per [address] REQUESTED  Medications that need to be addressed by a physician/nurse practitioner:    Medication Action Requested   Furosemide 40mg     Pt gets Bumex 2mg daily instead per [facility] -- please adjust if necessary   Isosorbide DN 10mg   Home dose is 20mg TID instead -- please adjust if appropriate    Multiple meds added to list (Mirtazapine, Singulair, etc) need review/reorder as appropriate           Please feel free to call me with any questions about this encounter. Thank you.     Mil Caruso [address] [place] [address]   Transitions of Care Pharmacy Service  Phone:  [phone]  Fax: [phone]      Electronically signed by Mil Caruso [address] [place] [address] on 12/29/2020 at 2:46 PM           Medications Prior to Admission: pantoprazole (PROTONIX) 40 MG tablet, Take 40 mg by mouth 2 times daily  rivaroxaban (XARELTO) 20 MG TABS tablet, Take 20 mg by mouth daily (with breakfast)  memantine ER (NAMENDA XR) 14 MG CP24 extended release capsule, Take 14 mg by mouth daily  Fluticasone furoate-vilanterol (BREO ELLIPTA) 200-25 MCG/INH AEPB inhaler, Inhale 1 puff into the lungs daily  mirtazapine (REMERON) 15 MG tablet, Take 15 mg by mouth nightly  montelukast (SINGULAIR) 10 MG tablet, Take 10 mg by mouth nightly  bumetanide (BUMEX) 2 MG tablet, Take 2 mg by mouth daily  donepezil (ARICEPT) 10 MG tablet, Take 10 mg by mouth nightly  vitamin D (ERGOCALCIFEROL) 1.25 MG (17500 UT) CAPS capsule, Take 50,000 Units by mouth once a week  carvedilol (COREG) 25 MG tablet, Take 25 mg by mouth 2 times daily (with meals) albuterol sulfate  (90 Base) MCG/ACT inhaler, Inhale 2 puffs into the lungs every 4 hours as needed   amLODIPine (NORVASC) 10 MG tablet, Take 10 mg by mouth daily  ferrous sulfate 325 (65 Fe) MG tablet, Take 325 mg by mouth 2 times daily (with meals)   fluticasone (FLONASE) 50 MCG/ACT nasal spray, 1 spray by Nasal route 2 times daily  folic acid (FOLVITE) 1 MG tablet, Take 1 tablet by mouth daily   hydrALAZINE (APRESOLINE) 50 MG tablet, Take 50 mg by mouth 3 times daily   mexiletine (MEXITIL) 150 MG capsule, Take 150 mg by mouth 3 times daily   potassium chloride (KLOR-CON M) 20 MEQ extended release tablet, Take 20 mEq by mouth daily   probenecid (BENEMID) 500 MG tablet, Take 500 mg by mouth 2 times daily   magnesium oxide (MAG-OX) 400 (240 Mg) MG tablet, Take 400 mg by mouth daily   isosorbide dinitrate (ISORDIL) 20 MG tablet, Take 20 mg by mouth 3 times daily

## 2020-12-29 NOTE — PROGRESS NOTES
[facility]  Office: [address], DO, Aaron Laury, DO, Yuliana Ramon, DO, Clare Saavedra Blood, DO, Florencio Conti MD, Joy Lopez MD, Carrington Anton MD, Jay Sterling MD, Alex Lerma MD, Gill Haney MD, Christina Little MD, Landy Bird MD, Arpit Rose MD, Francie Altamirano, DO, Adán Goel MD, Vida Moreno MD, Isabell James, DO, Sirena Sifuentes MD,  Colleen Diop, DO, Simba Mcclellan MD, Gaston Mace MD, Annie Gilman, [facility], [facility], CNP, Milo Reeves, CNP, Pita William, [facility], Joe Garcia, CNP, Lisa Taylor, CNP, Alexia Myles, CNP, Nisha Ricardo, CNP, Caity Bedoya, CNP, Abdi Zhang PA-C, Ihsan Hernandez, [facility], Neftali Cancino, CNP, Negrito Marinelli, CNP, Bebeto Freeman, CNP, Sherie Iyer, CNP, Master Ojeda [facility]    Progress Note    12/29/2020    11:59 AM    Name:   Angelika Swain  MRN:     [id number]     Acct:      [de-identified]   Room:   [facility]-01   Day:  1  Admit Date:  12/28/2020 12:13 PM    PCP:   No primary care provider on file. Code Status:  Prior    Subjective:     C/C:   Chief Complaint   Patient presents with    Abdominal Pain     pt has been exposed to covid     Interval History Status: improved. Patient was seen and evaluated at bedside this morning. She reports that she is feeling much better this morning. Patient denies any complaints. Patient denies any trouble with her breathing or cough.     Brief History: This is a 28-year-old female with past medical history of congestive heart failure, type 2 diabetes, asthma, COPD, polymorphic V. fib arrest, PAF status post AICD placement, GERD, CKD stage III who came to emergency department with complaints of abdominal pain. Patient was recently exposed to COVID-19 and was found to be positive upon admission as well. She was noted to have hemoglobin of 6 upon admission. Patient does follow-up with paramedical GI group. Patient was also noted to have acute on chronic kidney injury. Review of Systems:     Constitutional:  negative for chills, fevers, sweats  Respiratory:  negative for cough, dyspnea on exertion, shortness of breath, wheezing  Cardiovascular:  negative for chest pain, chest pressure/discomfort, lower extremity edema, palpitations  Gastrointestinal:  negative for abdominal pain, constipation, diarrhea, nausea, vomiting  Neurological:  negative for dizziness, headache    Medications: Allergies:     Allergies   Allergen Reactions    Codeine     Lisinopril Other (See Comments)     Angioedema    Percocet [Oxycodone-Acetaminophen]        Current Meds:   Scheduled Meds:    magnesium oxide  400 mg Oral TID    ferrous sulfate  325 mg Oral Daily with breakfast    atorvastatin  10 mg Oral Nightly    folic acid  1 mg Oral Daily    isosorbide dinitrate  10 mg Oral TID    dexamethasone  6 mg Oral Daily    amLODIPine  10 mg Oral Daily    fluticasone  1 spray Nasal BID    insulin lispro  0-6 Units Subcutaneous TID WC    insulin lispro  0-3 Units Subcutaneous Nightly    pantoprazole  40 mg Intravenous BID    ipratropium  2 puff Inhalation 4x Daily    furosemide  40 mg Oral Daily    carvedilol  3.125 mg Oral BID WC    sucralfate  1 g Oral 4 times per day     Continuous Infusions:    dextrose       PRN Meds: acetaminophen **OR** acetaminophen, glucose, dextrose, glucagon (rDNA), dextrose, albuterol sulfate HFA    Data: Past Medical History:   has a past medical history of Asthma, CHF (congestive heart failure) ([facility] Chronic obstructive pulmonary disease with acute exacerbation ([place] [address] Diabetes mellitus ([place] [address] Gastroesophageal reflux disease without esophagitis, Hep C w/o coma, chronic ([facility] PAF (paroxysmal atrial fibrillation) ([place] [address] and Systolic and diastolic CHF, chronic ([place] [address] Social History:   reports that she has quit smoking. She has never used smokeless tobacco. She reports that she does not drink alcohol or use drugs. Family History:   Family History   Problem Relation Age of Onset    Diabetes Mother     Diabetes Father        Vitals:  BP (!) 149/65   Pulse 62   Temp 98.3 °F (36.8 °C) (Oral)   Resp 20   Ht 5' 6\" (1.676 m)   Wt 190 lb (86.2 kg)   SpO2 93%   BMI 30.67 kg/m²   Temp (24hrs), Av.3 °F (36.8 °C), Min:97.6 °F (36.4 °C), Max:99.1 °F (37.3 °C)    Recent Labs     20  1456 20  2244 20  1102   POCGLU 148* 167* 147*       I/O (24Hr):     Intake/Output Summary (Last 24 hours) at 2020 1159  Last data filed at 2020 0746  Gross per 24 hour   Intake 60 ml   Output 350 ml   Net -290 ml       Labs:  Hematology:  Recent Labs     20  1230 20  1710 20  0033 20  0617 20  0917   WBC 5.8 5.5  --  4.5  --    RBC 2.27* 2.68*  --  2.89*  --    HGB 6.0* 7.3* 7.7* 7.9* 8.0*   HCT 20.5* 24.2* 24.9* 25.8* 26.0*   MCV 90.3 90.3  --  89.3  --    MCH 26.4 27.2  --  27.3  --    MCHC 29.3 30.2  --  30.6  --    RDW 17.8* 16.9*  --  16.8*  --     204  --  190  --    MPV 11.3 11.9  --  11.7  --    CRP  --   --   --  <3.0  --    DDIMER 3.34* 3.64*  --  8.60*  --      Chemistry:  Recent Labs     20  1230 20  1457 20  1710 20  0617     --  139 141   K 4.1  --  4.1 3.9     --  108* 107   CO2 23  --  22 23   GLUCOSE 241* 148 157* 140*   BUN 29*  --  25* 18   CREATININE 1.72*  --  1.26* 0.99*   MG  --   --   --  2.3 ANIONGAP 13  --  9 11   LABGLOM 29*  --  41* 54*   GFRAA 35*  --  50* >60   CALCIUM 8.1*  --  7.7* 8.0*   PROBNP 12,509*  --   --   --    LACTACIDWB  --   --   --  NOT REPORTED     Recent Labs     12/28/20  1230 12/28/20  1456 12/28/20  1710 12/28/20  2244 12/29/20  0617 12/29/20  1102   PROT  --   --  5.9*  6.5  --  6.5  --    LABALBU  --   --  3.0*  --  2.7*  --    AST  --   --  82*  --  81*  --    ALT  --   --  52*  --  67*  --    *  --   --   --   --   --    ALKPHOS  --   --  63  --  72  --    BILITOT  --   --  0.44  --  0.42  --    AMYLASE  --   --   --   --  61  --    LIPASE  --   --   --   --  30  --    POCGLU  --  148*  --  167*  --  147*     ABG:  Lab Results   Component Value Date    POCPH 7.47 12/28/2020    PHART 7.380 02/07/2016    POCPCO2 31 12/28/2020    GBE5HAO 36.0 02/07/2016    POCPO2 62 12/28/2020    PO2ART 118.0 02/07/2016    POCHCO3 22.7 12/28/2020    XHX9IYH 20.8 02/07/2016    NBEA 1 12/28/2020    PBEA NOT REPORTED 12/28/2020    YLC4LHK 24 12/28/2020    FMWY4VYU 93 12/28/2020    M5QOEWCZ 98.5 02/07/2016    FIO2 28.0 12/28/2020     Lab Results   Component Value Date/Time    SPECIAL NOT REPORTED 12/28/2020 05:20 PM    SPECIAL NOT REPORTED 12/28/2020 05:20 PM     Lab Results   Component Value Date/Time    CULTURE NO GROWTH 10 HOURS 12/28/2020 05:20 PM    CULTURE NO GROWTH 10 HOURS 12/28/2020 05:20 PM       Radiology:  Ct Abdomen Pelvis Wo Contrast Additional Contrast? Oral    Result Date: 12/29/2020  1. Patchy ground-glass opacities in the lung bases compatible with atypical/viral pneumonia, and consistent with history of COVID-19 pneumonia. 2. Small bilateral pleural effusions and diffuse body wall edema. Findings compatible with CHF. 3. Retroperitoneal induration about the pancreas raising the possibility for acute pancreatitis. 4. Normal appendix. 5. Stool throughout the colon in keeping with constipation.      Nm Lung Vent/perfusion (vq)    Result Date: 12/28/2020

## 2020-12-29 NOTE — ED NOTES
RN spoke to Brad NP from [facility] about patient status and possibility of downgrading patient to progressive status. Brad states she will have Latrice Lopezers NP come to evaluate patient.       Roger Ruiz RN  12/28/20 2122

## 2020-12-29 NOTE — ED NOTES
Spoke with , would like to be updated once rm is assigned.  [phone]     Brianda Catalan RN  12/28/20 4613

## 2020-12-30 LAB
ABSOLUTE EOS #: 0 K/UL (ref 0–0.4)
ABSOLUTE IMMATURE GRANULOCYTE: 0.07 K/UL (ref 0–0.3)
ABSOLUTE LYMPH #: 0.58 K/UL (ref 1–4.8)
ABSOLUTE MONO #: 0.44 K/UL (ref 0.2–0.8)
ALBUMIN (CALCULATED): 3 G/DL (ref 3.2–5.2)
ALBUMIN PERCENT: 52 % (ref 45–65)
ALBUMIN SERPL-MCNC: 2.7 G/DL (ref 3.5–5.2)
ALBUMIN/GLOBULIN RATIO: ABNORMAL (ref 1–2.5)
ALP BLD-CCNC: 86 U/L (ref 35–104)
ALPHA 1 PERCENT: 4 % (ref 3–6)
ALPHA 2 PERCENT: 15 % (ref 6–13)
ALPHA-1-GLOBULIN: 0.2 G/DL (ref 0.1–0.4)
ALPHA-2-GLOBULIN: 0.9 G/DL (ref 0.5–0.9)
ALT SERPL-CCNC: 110 U/L (ref 5–33)
ANION GAP SERPL CALCULATED.3IONS-SCNC: 9 MMOL/L (ref 9–17)
ANTI-NUCLEAR ANTIBODY (ANA): NEGATIVE
AST SERPL-CCNC: 92 U/L
BASOPHILS # BLD: 0 %
BASOPHILS ABSOLUTE: 0 K/UL (ref 0–0.2)
BETA GLOBULIN: 0.6 G/DL (ref 0.5–1.1)
BETA PERCENT: 10 % (ref 11–19)
BILIRUB SERPL-MCNC: 0.39 MG/DL (ref 0.3–1.2)
BUN BLDV-MCNC: 20 MG/DL (ref 8–23)
BUN/CREAT BLD: 20 (ref 9–20)
CALCIUM SERPL-MCNC: 8.2 MG/DL (ref 8.6–10.4)
CHLORIDE BLD-SCNC: 108 MMOL/L (ref 98–107)
CO2: 26 MMOL/L (ref 20–31)
CREAT SERPL-MCNC: 0.98 MG/DL (ref 0.5–0.9)
D-DIMER QUANTITATIVE: 16.9 MG/L FEU (ref 0–0.59)
DIFFERENTIAL TYPE: ABNORMAL
EOSINOPHILS RELATIVE PERCENT: 0 % (ref 1–4)
FIBRINOGEN: 371 MG/DL (ref 179–518)
GAMMA GLOBULIN %: 21 % (ref 9–20)
GAMMA GLOBULIN: 1.2 G/DL (ref 0.5–1.5)
GFR AFRICAN AMERICAN: >60 ML/MIN
GFR NON-AFRICAN AMERICAN: 55 ML/MIN
GFR SERPL CREATININE-BSD FRML MDRD: ABNORMAL ML/MIN/{1.73_M2}
GFR SERPL CREATININE-BSD FRML MDRD: ABNORMAL ML/MIN/{1.73_M2}
GLUCOSE BLD-MCNC: 139 MG/DL (ref 65–105)
GLUCOSE BLD-MCNC: 141 MG/DL (ref 70–99)
GLUCOSE BLD-MCNC: 195 MG/DL (ref 65–105)
GLUCOSE BLD-MCNC: 203 MG/DL (ref 65–105)
GLUCOSE BLD-MCNC: 240 MG/DL (ref 65–105)
HCT VFR BLD CALC: 24.6 % (ref 36.3–47.1)
HCT VFR BLD CALC: 25.2 % (ref 36.3–47.1)
HCT VFR BLD CALC: 27.7 % (ref 36.3–47.1)
HEMOGLOBIN: 7.8 G/DL (ref 11.9–15.1)
HEMOGLOBIN: 7.8 G/DL (ref 11.9–15.1)
HEMOGLOBIN: 8.6 G/DL (ref 11.9–15.1)
IMMATURE GRANULOCYTES: 1 %
LYMPHOCYTES # BLD: 8 % (ref 24–44)
MAGNESIUM: 2.1 MG/DL (ref 1.6–2.6)
MCH RBC QN AUTO: 27.3 PG (ref 25.2–33.5)
MCHC RBC AUTO-ENTMCNC: 31 G/DL (ref 28.4–34.8)
MCV RBC AUTO: 88.1 FL (ref 82.6–102.9)
MONOCYTES # BLD: 6 % (ref 1–7)
MORPHOLOGY: ABNORMAL
MORPHOLOGY: ABNORMAL
NRBC AUTOMATED: 1.9 PER 100 WBC
PATHOLOGIST: ABNORMAL
PATHOLOGIST: NORMAL
PDW BLD-RTO: 16.9 % (ref 11.8–14.4)
PLATELET # BLD: 184 K/UL (ref 138–453)
PLATELET ESTIMATE: ABNORMAL
PMV BLD AUTO: 11.6 FL (ref 8.1–13.5)
POTASSIUM SERPL-SCNC: 3.4 MMOL/L (ref 3.7–5.3)
PROCALCITONIN: 0.21 NG/ML
PROTEIN ELECTROPHORESIS, SERUM: ABNORMAL
RBC # BLD: 2.86 M/UL (ref 3.95–5.11)
RBC # BLD: ABNORMAL 10*6/UL
SEG NEUTROPHILS: 85 % (ref 36–66)
SEGMENTED NEUTROPHILS ABSOLUTE COUNT: 6.21 K/UL (ref 1.8–7.7)
SERUM IFX INTERP: NORMAL
SODIUM BLD-SCNC: 143 MMOL/L (ref 135–144)
TOTAL PROT. SUM,%: 102 % (ref 98–102)
TOTAL PROT. SUM: 5.9 G/DL (ref 6.3–8.2)
TOTAL PROTEIN: 5.9 G/DL (ref 6.4–8.3)
TOTAL PROTEIN: 6.3 G/DL (ref 6.4–8.3)
WBC # BLD: 7.3 K/UL (ref 3.5–11.3)
WBC # BLD: ABNORMAL 10*3/UL

## 2020-12-30 PROCEDURE — 6370000000 HC RX 637 (ALT 250 FOR IP): Performed by: NURSE PRACTITIONER

## 2020-12-30 PROCEDURE — 97162 PT EVAL MOD COMPLEX 30 MIN: CPT

## 2020-12-30 PROCEDURE — 84145 PROCALCITONIN (PCT): CPT

## 2020-12-30 PROCEDURE — 94761 N-INVAS EAR/PLS OXIMETRY MLT: CPT

## 2020-12-30 PROCEDURE — 82947 ASSAY GLUCOSE BLOOD QUANT: CPT

## 2020-12-30 PROCEDURE — C9113 INJ PANTOPRAZOLE SODIUM, VIA: HCPCS | Performed by: NURSE PRACTITIONER

## 2020-12-30 PROCEDURE — 99232 SBSQ HOSP IP/OBS MODERATE 35: CPT | Performed by: INTERNAL MEDICINE

## 2020-12-30 PROCEDURE — 85018 HEMOGLOBIN: CPT

## 2020-12-30 PROCEDURE — 85384 FIBRINOGEN ACTIVITY: CPT

## 2020-12-30 PROCEDURE — 83735 ASSAY OF MAGNESIUM: CPT

## 2020-12-30 PROCEDURE — 80053 COMPREHEN METABOLIC PANEL: CPT

## 2020-12-30 PROCEDURE — 6370000000 HC RX 637 (ALT 250 FOR IP): Performed by: INTERNAL MEDICINE

## 2020-12-30 PROCEDURE — 85014 HEMATOCRIT: CPT

## 2020-12-30 PROCEDURE — 6360000002 HC RX W HCPCS: Performed by: NURSE PRACTITIONER

## 2020-12-30 PROCEDURE — 94640 AIRWAY INHALATION TREATMENT: CPT

## 2020-12-30 PROCEDURE — 85379 FIBRIN DEGRADATION QUANT: CPT

## 2020-12-30 PROCEDURE — 2700000000 HC OXYGEN THERAPY PER DAY

## 2020-12-30 PROCEDURE — 97116 GAIT TRAINING THERAPY: CPT

## 2020-12-30 PROCEDURE — 85025 COMPLETE CBC W/AUTO DIFF WBC: CPT

## 2020-12-30 PROCEDURE — 36415 COLL VENOUS BLD VENIPUNCTURE: CPT

## 2020-12-30 PROCEDURE — 97530 THERAPEUTIC ACTIVITIES: CPT

## 2020-12-30 PROCEDURE — 2060000000 HC ICU INTERMEDIATE R&B

## 2020-12-30 RX ORDER — POTASSIUM CHLORIDE 20 MEQ/1
40 TABLET, EXTENDED RELEASE ORAL PRN
Status: DISCONTINUED | OUTPATIENT
Start: 2020-12-30 | End: 2020-12-31 | Stop reason: HOSPADM

## 2020-12-30 RX ORDER — POTASSIUM CHLORIDE 7.45 MG/ML
10 INJECTION INTRAVENOUS PRN
Status: DISCONTINUED | OUTPATIENT
Start: 2020-12-30 | End: 2020-12-31 | Stop reason: HOSPADM

## 2020-12-30 RX ORDER — POTASSIUM CHLORIDE 20 MEQ/1
20 TABLET, EXTENDED RELEASE ORAL ONCE
Status: COMPLETED | OUTPATIENT
Start: 2020-12-30 | End: 2020-12-30

## 2020-12-30 RX ADMIN — IPRATROPIUM BROMIDE 2 PUFF: 17 AEROSOL, METERED RESPIRATORY (INHALATION) at 15:26

## 2020-12-30 RX ADMIN — PANTOPRAZOLE SODIUM 40 MG: 40 INJECTION, POWDER, FOR SOLUTION INTRAVENOUS at 08:51

## 2020-12-30 RX ADMIN — FERROUS SULFATE TAB EC 325 MG (65 MG FE EQUIVALENT) 325 MG: 325 (65 FE) TABLET DELAYED RESPONSE at 08:50

## 2020-12-30 RX ADMIN — ISOSORBIDE DINITRATE 10 MG: 10 TABLET ORAL at 13:27

## 2020-12-30 RX ADMIN — CARVEDILOL 3.12 MG: 3.12 TABLET, FILM COATED ORAL at 16:57

## 2020-12-30 RX ADMIN — DEXAMETHASONE 6 MG: 4 TABLET ORAL at 08:51

## 2020-12-30 RX ADMIN — IPRATROPIUM BROMIDE 2 PUFF: 17 AEROSOL, METERED RESPIRATORY (INHALATION) at 11:48

## 2020-12-30 RX ADMIN — SUCRALFATE 1 G: 1 TABLET ORAL at 11:57

## 2020-12-30 RX ADMIN — ATORVASTATIN CALCIUM 10 MG: 10 TABLET, FILM COATED ORAL at 19:59

## 2020-12-30 RX ADMIN — ISOSORBIDE DINITRATE 10 MG: 10 TABLET ORAL at 08:50

## 2020-12-30 RX ADMIN — ALBUTEROL SULFATE 2 PUFF: 90 AEROSOL, METERED RESPIRATORY (INHALATION) at 15:26

## 2020-12-30 RX ADMIN — ALBUTEROL SULFATE 2 PUFF: 90 AEROSOL, METERED RESPIRATORY (INHALATION) at 06:21

## 2020-12-30 RX ADMIN — ISOSORBIDE DINITRATE 10 MG: 10 TABLET ORAL at 19:59

## 2020-12-30 RX ADMIN — FUROSEMIDE 40 MG: 40 TABLET ORAL at 08:51

## 2020-12-30 RX ADMIN — IPRATROPIUM BROMIDE 2 PUFF: 17 AEROSOL, METERED RESPIRATORY (INHALATION) at 06:21

## 2020-12-30 RX ADMIN — AMLODIPINE BESYLATE 10 MG: 5 TABLET ORAL at 09:15

## 2020-12-30 RX ADMIN — ALBUTEROL SULFATE 2 PUFF: 90 AEROSOL, METERED RESPIRATORY (INHALATION) at 11:48

## 2020-12-30 RX ADMIN — MAGNESIUM GLUCONATE 500 MG ORAL TABLET 400 MG: 500 TABLET ORAL at 13:27

## 2020-12-30 RX ADMIN — POTASSIUM CHLORIDE 20 MEQ: 20 TABLET, EXTENDED RELEASE ORAL at 19:59

## 2020-12-30 RX ADMIN — SUCRALFATE 1 G: 1 TABLET ORAL at 05:56

## 2020-12-30 RX ADMIN — SUCRALFATE 1 G: 1 TABLET ORAL at 16:57

## 2020-12-30 RX ADMIN — MAGNESIUM GLUCONATE 500 MG ORAL TABLET 400 MG: 500 TABLET ORAL at 20:00

## 2020-12-30 RX ADMIN — POTASSIUM CHLORIDE 40 MEQ: 20 TABLET, EXTENDED RELEASE ORAL at 11:00

## 2020-12-30 RX ADMIN — FLUTICASONE PROPIONATE 1 SPRAY: 50 SPRAY, METERED NASAL at 08:52

## 2020-12-30 RX ADMIN — FOLIC ACID 1 MG: 1 TABLET ORAL at 08:50

## 2020-12-30 RX ADMIN — INSULIN LISPRO 2 UNITS: 100 INJECTION, SOLUTION INTRAVENOUS; SUBCUTANEOUS at 11:53

## 2020-12-30 RX ADMIN — CARVEDILOL 3.12 MG: 3.12 TABLET, FILM COATED ORAL at 08:51

## 2020-12-30 RX ADMIN — ALBUTEROL SULFATE 2 PUFF: 90 AEROSOL, METERED RESPIRATORY (INHALATION) at 20:56

## 2020-12-30 RX ADMIN — PANTOPRAZOLE SODIUM 40 MG: 40 INJECTION, POWDER, FOR SOLUTION INTRAVENOUS at 20:00

## 2020-12-30 RX ADMIN — MAGNESIUM GLUCONATE 500 MG ORAL TABLET 400 MG: 500 TABLET ORAL at 08:50

## 2020-12-30 RX ADMIN — INSULIN LISPRO 1 UNITS: 100 INJECTION, SOLUTION INTRAVENOUS; SUBCUTANEOUS at 20:19

## 2020-12-30 RX ADMIN — IPRATROPIUM BROMIDE 2 PUFF: 17 AEROSOL, METERED RESPIRATORY (INHALATION) at 20:56

## 2020-12-30 RX ADMIN — INSULIN LISPRO 1 UNITS: 100 INJECTION, SOLUTION INTRAVENOUS; SUBCUTANEOUS at 16:57

## 2020-12-30 ASSESSMENT — PAIN SCALES - WONG BAKER
WONGBAKER_NUMERICALRESPONSE: 0

## 2020-12-30 ASSESSMENT — PAIN SCALES - GENERAL: PAINLEVEL_OUTOF10: 0

## 2020-12-30 NOTE — PROGRESS NOTES
Renal Progress Note    Patient :  Maria Guadalupe Parra; 66 y.o. MRN# [id number]  Location:  1424/4921-08  Attending:  Alfonso Orozco MD  Admit Date:  12/28/2020   Hospital Day: 2      Subjective:     Urine output good, although not being measured. Received 2 units of packed cells yesterday. CT scan of the abdomen reviewed, showed some evidence of inflammation around the pancreas. Also showed groundglass opacities in the lung bases consistent with COVID-19 pneumonia. Creatinine down to 0.9. Hemodynamically stable. Continues on home dose of loop diuretics. Blood sugars well controlled. Convalescent plasma has been ordered. Feels much better this morning, no shortness of breath orthopnea. Hemodynamically stable. Overnight has continued to make good progress. Urine output continues to be good. Creatinine at baseline. Hemodynamically stable. Appetite has improved significantly. Continues on home dose of Lasix. Work-up so far shows stable M spike and IgG kappa region of 0.43 g/dL.   Hemoglobin 7 stable at 7.8  Awaiting placement  Outpatient Medications:     Medications Prior to Admission: pantoprazole (PROTONIX) 40 MG tablet, Take 40 mg by mouth 2 times daily  rivaroxaban (XARELTO) 20 MG TABS tablet, Take 20 mg by mouth daily (with breakfast)  memantine ER (NAMENDA XR) 14 MG CP24 extended release capsule, Take 14 mg by mouth daily  Fluticasone furoate-vilanterol (BREO ELLIPTA) 200-25 MCG/INH AEPB inhaler, Inhale 1 puff into the lungs daily  mirtazapine (REMERON) 15 MG tablet, Take 15 mg by mouth nightly  montelukast (SINGULAIR) 10 MG tablet, Take 10 mg by mouth nightly  bumetanide (BUMEX) 2 MG tablet, Take 2 mg by mouth daily  donepezil (ARICEPT) 10 MG tablet, Take 10 mg by mouth nightly  vitamin D (ERGOCALCIFEROL) 1.25 MG (27722 UT) CAPS capsule, Take 50,000 Units by mouth once a week  carvedilol (COREG) 25 MG tablet, Take 25 mg by mouth 2 times daily (with meals) albuterol sulfate  (90 Base) MCG/ACT inhaler, Inhale 2 puffs into the lungs every 4 hours as needed   amLODIPine (NORVASC) 10 MG tablet, Take 10 mg by mouth daily  ferrous sulfate 325 (65 Fe) MG tablet, Take 325 mg by mouth 2 times daily (with meals)   fluticasone (FLONASE) 50 MCG/ACT nasal spray, 1 spray by Nasal route 2 times daily  folic acid (FOLVITE) 1 MG tablet, Take 1 tablet by mouth daily   hydrALAZINE (APRESOLINE) 50 MG tablet, Take 50 mg by mouth 3 times daily   mexiletine (MEXITIL) 150 MG capsule, Take 150 mg by mouth 3 times daily   potassium chloride (KLOR-CON M) 20 MEQ extended release tablet, Take 20 mEq by mouth daily   probenecid (BENEMID) 500 MG tablet, Take 500 mg by mouth 2 times daily   magnesium oxide (MAG-OX) 400 (240 Mg) MG tablet, Take 400 mg by mouth daily   isosorbide dinitrate (ISORDIL) 20 MG tablet, Take 20 mg by mouth 3 times daily   [DISCONTINUED] Acetaminophen 500 MG CAPS, Take 100 mg by mouth 2 times daily  [DISCONTINUED] alendronate (FOSAMAX) 70 MG tablet, Take 70 mg by mouth every 7 days  [DISCONTINUED] atorvastatin (LIPITOR) 10 MG tablet, Take 10 mg by mouth nightly  [DISCONTINUED] B Complex-Folic Acid (B COMPLEX-VITAMIN B12 PO), Take 1 tablet by mouth as needed  [DISCONTINUED] bisacodyl (DULCOLAX) 5 MG EC tablet, Take 5 mg by mouth as needed  [DISCONTINUED] guaiFENesin (MUCINEX) 600 MG extended release tablet, Take 1,200 mg by mouth as needed  [DISCONTINUED] metFORMIN (GLUCOPHAGE) 500 MG tablet, Take 500 mg by mouth 2 times daily  [DISCONTINUED] carvedilol (COREG) 25 MG tablet, Take 25 mg by mouth 2 times daily  [DISCONTINUED] diclofenac sodium 1 % GEL, Apply 2 g topically as needed  [DISCONTINUED] NONFORMULARY, Take 75 mg by mouth 2 times daily Indications: Arthrotez    Current Medications:     Scheduled Meds:    potassium chloride  20 mEq Oral Once    magnesium oxide  400 mg Oral TID    ferrous sulfate  325 mg Oral Daily with breakfast  atorvastatin  10 mg Oral Nightly    folic acid  1 mg Oral Daily    isosorbide dinitrate  10 mg Oral TID    dexamethasone  6 mg Oral Daily    amLODIPine  10 mg Oral Daily    fluticasone  1 spray Nasal BID    insulin lispro  0-6 Units Subcutaneous TID WC    insulin lispro  0-3 Units Subcutaneous Nightly    pantoprazole  40 mg Intravenous BID    ipratropium  2 puff Inhalation 4x Daily    furosemide  40 mg Oral Daily    carvedilol  3.125 mg Oral BID WC    sucralfate  1 g Oral 4 times per day     Continuous Infusions:    dextrose       PRN Meds:  potassium chloride **OR** potassium alternative oral replacement **OR** potassium chloride, acetaminophen **OR** acetaminophen, glucose, dextrose, glucagon (rDNA), dextrose, albuterol sulfate HFA    Input/Output:       I/O last 3 completed shifts: In: 540 [P.O.:540]  Out: 1175 [Urine:1175]. Patient Vitals for the past 96 hrs (Last 3 readings):   Weight   20 0746 190 lb (86.2 kg)   20 1216 190 lb (86.2 kg)       Vital Signs:   Temperature:  Temp: 98.7 °F (37.1 °C)  TMax:   Temp (24hrs), Av.9 °F (36.6 °C), Min:97.2 °F (36.2 °C), Max:98.7 °F (37.1 °C)    Respirations:  Resp: 18  Pulse:   Pulse: 60  BP:    BP: (!) 145/56  BP Range: Systolic (77HQZ), KVP:126 , Min:129 , UZB:490       Diastolic (28UHP), ALISIA:28, Min:48, Max:85      Physical Examination:     General:  AAO x 3, speaking in full sentences, no accessory muscle use. HEENT: Atraumatic, normocephalic, no throat congestion, moist mucosa. Eyes:   Pupils equal, round and reactive to light, EOMI. Neck:   No JVD, no thyromegaly, no lymphadenopathy. Chest:  Bilateral vesicular breath sounds, no rales or wheezes. Cardiac:  S1 S2 RR, no murmurs, gallops or rubs, JVP not raised. Abdomen: Soft, non-tender, no masses or organomegaly, BS audible. :   No suprapubic or flank tenderness. Neuro:  AAO x 3, No FND. SKIN:  No rashes, good skin turgor. Extremities:  No edema, palpable peripheral pulses, no calf tenderness. Labs:       Recent Labs     12/28/20  1710 12/28/20  1710 12/29/20  0617 12/29/20  0617 12/29/20  1644 12/30/20  0051 12/30/20  0635   WBC 5.5  --  4.5  --   --   --  7.3   RBC 2.68*  --  2.89*  --   --   --  2.86*   HGB 7.3*   < > 7.9*   < > 7.9* 7.8* 7.8*   HCT 24.2*   < > 25.8*   < > 25.5* 24.6* 25.2*   MCV 90.3  --  89.3  --   --   --  88.1   MCH 27.2  --  27.3  --   --   --  27.3   MCHC 30.2  --  30.6  --   --   --  31.0   RDW 16.9*  --  16.8*  --   --   --  16.9*     --  190  --   --   --  184   MPV 11.9  --  11.7  --   --   --  11.6    < > = values in this interval not displayed. BMP:   Recent Labs     12/28/20  1710 12/29/20  0617 12/30/20  0635    141 143   K 4.1 3.9 3.4*   * 107 108*   CO2 22 23 26   BUN 25* 18 20   CREATININE 1.26* 0.99* 0.98*   GLUCOSE 157* 140* 141*   CALCIUM 7.7* 8.0* 8.2*      Phosphorus:   No results for input(s): PHOS in the last 72 hours. Magnesium:    Recent Labs     12/29/20  0617 12/30/20  0635   MG 2.3 2.1     Albumin:    Recent Labs     12/28/20  1710 12/29/20  0617 12/30/20  0635   LABALBU 3.0* 2.7* 2.7*     BNP:      Lab Results   Component Value Date    BNP 1,141 08/10/2012     DARCI:      Lab Results   Component Value Date    DARCI NEGATIVE 12/29/2020     SPEP:  Lab Results   Component Value Date    PROT 6.3 12/30/2020    ALBCAL 3.0 12/28/2020    ALBPCT 52 12/28/2020    LABALPH 0.2 12/28/2020    LABALPH 0.9 12/28/2020    A1PCT 4 12/28/2020    A2PCT 15 12/28/2020    LABBETA 0.6 12/28/2020    BETAPCT 10 12/28/2020    GAMGLOB 1.2 12/28/2020    GGPCT 21 12/28/2020    PATH ELECTRONICALLY SIGNED. Chandan Paniagua M.D. 12/28/2020    PATH ELECTRONICALLY SIGNED.  Chandan Paniagua M.D. 12/28/2020     UPEP:   No results found for: LABPE  C3:     Lab Results   Component Value Date    C3 110 12/29/2020     C4:     Lab Results   Component Value Date    C4 25 12/29/2020 MPO ANCA:   No results found for: MPO  PR3 ANCA:   No results found for: PR3  Anti-GBM:   No results found for: GBMABIGG  Hep BsAg:         Lab Results   Component Value Date    HEPBSAG NONREACTIVE 02/11/2016     Hep C AB:          Lab Results   Component Value Date    HEPCAB REACTIVE 02/11/2016       Urinalysis/Chemistries:      Lab Results   Component Value Date    NITRU NEGATIVE 12/29/2020    COLORU YELLOW 12/29/2020    PHUR 6.0 12/29/2020    WBCUA 5 TO 10 04/09/2016    RBCUA 0 TO 2 04/09/2016    MUCUS NOT REPORTED 04/09/2016    TRICHOMONAS NOT REPORTED 04/09/2016    YEAST NOT REPORTED 04/09/2016    BACTERIA NOT REPORTED 04/09/2016    SPECGRAV 1.020 12/29/2020    LEUKOCYTESUR NEGATIVE 12/29/2020    UROBILINOGEN Normal 12/29/2020    BILIRUBINUR NEGATIVE 12/29/2020    BILIRUBINUR NEGATIVE 04/22/2012    GLUCOSEU NEGATIVE 12/29/2020    GLUCOSEU NEGATIVE 04/22/2012    KETUA NEGATIVE 12/29/2020    AMORPHOUS NOT REPORTED 04/09/2016     Urine Sodium:   No results found for: TALYA  Urine Potassium:  No results found for: KUR  Urine Chloride:  No results found for: CLUR  Urine Osmolarity: No results found for: OSMOU  Urine Protein:   No components found for: TOTALPROTEIN, URINE   Urine Creatinine:     Lab Results   Component Value Date    LABCREA 106.6 12/28/2020     Urine Eosinophils:  No components found for: UEOS    Radiology:     CXR:     Assessment:     1. Acute Kidney Injury: Secondary to ischemic ATN from acute anemia or drop in hemoglobin, systemic inflammatory response and  COVID-19 pneumonia, volume depletion from decreased intake, baseline creatinine 1.11.2 peaked up to 1.8 now down to 0.9 with restoration of blood volume and blood transfusions  2. Chronic kidney disease stage III from diabetes baseline 1.11.2  3. COVID-19 pneumonia without overt respiratory failure  4. Acute anemia with symptoms  5. Suspected pancreatitis based on imaging studies  6. Type 2 diabetes with complications  7.   COPD 8.  IgG kappa monoclonal gammopathy of unknown significance    Plan:   1. Continue home dose of Lasix  2. Increase oral intake   3. Agree with intravenous iron   4. Can resume home medications, replace potassium  5. Keep systolic pressure more than 110  6. Stable for discharge from nephrology standpoint  7. No need for nephrology follow-up  8. May need to see hematology oncology for IgG kappa monoclonal gammopathy which is stable  9. Nothing else to add will sign off please call for questions    Nutrition   Please ensure that patient is on a renal diet/TF. Avoid nephrotoxic drugs/contrast exposure. We will continue to follow along with you.

## 2020-12-30 NOTE — PLAN OF CARE
Problem: Skin Integrity:  Goal: Will show no infection signs and symptoms  Description: Will show no infection signs and symptoms  12/29/2020 2153 by Claude Doe RN  Outcome: Ongoing     Problem: Skin Integrity:  Goal: Absence of new skin breakdown  Description: Absence of new skin breakdown  12/29/2020 2153 by Claude Doe RN  Outcome: Ongoing     Problem: Airway Clearance - Ineffective  Goal: Achieve or maintain patent airway  12/29/2020 2153 by Claude Doe RN  Outcome: Ongoing     Problem: Gas Exchange - Impaired  Goal: Absence of hypoxia  12/29/2020 2153 by Claude Doe RN  Outcome: Ongoing     Problem: Gas Exchange - Impaired  Goal: Promote optimal lung function  12/29/2020 2153 by Claude Doe RN  Outcome: Ongoing     Problem: Body Temperature -  Risk of, Imbalanced  Goal: Ability to maintain a body temperature within defined limits  12/29/2020 2153 by Claude Doe RN  Outcome: Ongoing     Problem: Body Temperature -  Risk of, Imbalanced  Goal: Will regain or maintain usual level of consciousness  12/29/2020 2153 by Claude Doe RN  Outcome: Ongoing     Problem:  Body Temperature -  Risk of, Imbalanced  Goal: Complications related to the disease process, condition or treatment will be avoided or minimized  12/29/2020 2153 by Claude Doe RN  Outcome: Ongoing     Problem: Isolation Precautions - Risk of Spread of Infection  Goal: Prevent transmission of infection  12/29/2020 2153 by Claude Doe RN  Outcome: Ongoing     Problem: Risk for Fluid Volume Deficit  Goal: Maintain normal heart rhythm  12/29/2020 2153 by Claude Doe RN  Outcome: Ongoing     Problem: Risk for Fluid Volume Deficit  Goal: Maintain absence of muscle cramping  12/29/2020 2153 by Claude Doe RN  Outcome: Ongoing     Problem: Risk for Fluid Volume Deficit  Goal: Maintain normal serum potassium, sodium, calcium, phosphorus, and pH  12/29/2020 2153 by Claude Doe RN Outcome: Ongoing     Problem: Fatigue  Goal: Verbalize increase energy and improved vitality  12/29/2020 2153 by Mary Stewart RN  Outcome: Ongoing     Problem: Patient Education: Go to Patient Education Activity  Goal: Patient/Family Education  12/29/2020 2153 by Mary Stewart RN  Outcome: Ongoing     Problem: Falls - Risk of:  Goal: Will remain free from falls  Description: Will remain free from falls  12/29/2020 2153 by Mary Stewart RN  Outcome: Ongoing     Problem: Pain:  Goal: Pain level will decrease  Description: Pain level will decrease  Outcome: Ongoing     Problem: Pain:  Goal: Control of acute pain  Description: Control of acute pain  Outcome: Ongoing     Problem: Pain:  Goal: Control of chronic pain  Description: Control of chronic pain  Outcome: Ongoing

## 2020-12-30 NOTE — PROGRESS NOTES
[facility]   Progress Note    Maria Guadalupe Parra is a 66 y.o. female patient. Hospitalization Day:2      Chief consult reason:   Abdominal pain  Anemia  Subjective:  Staff states pt denies any abdominal pain, nausea or vomiting. No acute changes overnight  Unable to complete US due to COVID and that she has had a CT scan  Iron panel shows Iron deficiency  AFP normal 1.3  Ceruloplasmin and Alpha 1 Antitrypsin WNL  VITALS:  BP (!) 148/67   Pulse 60   Temp 97.3 °F (36.3 °C) (Oral)   Resp 18   Ht 5' 6\" (1.676 m)   Wt 190 lb (86.2 kg)   SpO2 100%   BMI 30.67 kg/m²   TEMPERATURE:  Current - Temp: 97.3 °F (36.3 °C); Max - Temp  Av.8 °F (36.6 °C)  Min: 97.2 °F (36.2 °C)  Max: 98.2 °F (36.8 °C)    Physical Assessment:  Defered to primary as pt is in COVID isolation    Data Review:    Labs and Imaging:     CBC:  Recent Labs     20  1230 20  1710 20  1710 20  0617 20  0917 20  1644 20  0051 20  0635   WBC 5.8 5.5  --  4.5  --   --   --  7.3   HGB 6.0* 7.3*   < > 7.9* 8.0* 7.9* 7.8* 7.8*   MCV 90.3 90.3  --  89.3  --   --   --  88.1   RDW 17.8* 16.9*  --  16.8*  --   --   --  16.9*    204  --  190  --   --   --  184    < > = values in this interval not displayed.        ANEMIA STUDIES:  Recent Labs     20  1230 20  1352   LABIRON  --  15*   TIBC  --  183*   FERRITIN 153*  --        BMP:  Recent Labs     20  1710 20  0617 20  0635    141 143   K 4.1 3.9 3.4*   * 107 108*   CO2 22 23 26   BUN 25* 18 20   CREATININE 1.26* 0.99* 0.98*   GLUCOSE 157* 140* 141*   CALCIUM 7.7* 8.0* 8.2*       LFTS:  Recent Labs     20  1710 20  0617 20  0635   ALKPHOS 63 72 86   ALT 52* 67* 110*   AST 82* 81* 92*   BILITOT 0.44 0.42 0.39   LABALBU 3.0* 2.7* 2.7*       Amylase/Lipase and Ammonia:  Recent Labs     20  0617   AMYLASE 61   LIPASE 30       Acute Hepatitis Panel:  Lab Results Component Value Date    HEPBSAG NONREACTIVE 02/11/2016    HEPCAB REACTIVE 02/11/2016    HEPBIGM NONREACTIVE 02/11/2016    HEPAIGM NONREACTIVE 02/11/2016       HCV Genotype:  No results found for: HEPATITISCGENOTYPE    HCV Quantitative:  No results found for: HCVQNT    LIVER WORK UP:    AFP  Lab Results   Component Value Date    AFP 1.3 12/29/2020       Alpha 1 antitrypsin   Lab Results   Component Value Date    A1A 199 12/29/2020       DARCI  Lab Results   Component Value Date    DARCI NEGATIVE 05/27/2020       AMA  No results found for: MITOAB    ASMA  No results found for: SMOOTHMUSCAB    PT/INR  No results for input(s): PROTIME, INR in the last 72 hours. Cancer Markers:  CEA:  No results for input(s): CEA in the last 72 hours. Ca 125:  No results for input(s):  in the last 72 hours. Ca 19-9:   Invalid input(s):   AFP:   Recent Labs     12/29/20  1352   AFP 1.3     Lactic acid:Invalid input(s): LACTIC ACID    Radiology Review:    12/28/2020 CT scan A/P     FINDINGS:   Lower Chest: Small bilateral pleural effusions.  Multifocal patchy   ground-glass opacities in the lung bases.  No pericardial effusion.  Coronary   artery disease.       Organs: Evaluation of the solid organs is compromised by lack of IV contrast.   The unenhanced liver, spleen, adrenal glands, and kidneys are grossly   unremarkable.  Retroperitoneal induration about the pancreatic body.  The   gallbladder is surgically absent.       GI/Bowel: The stomach and small bowel loops are unremarkable.  No small bowel   obstruction.  Normal appendix.  The colon is intact.  Stool present   throughout the colon.       Pelvis: The bladder is fluid filled and is unremarkable.  Uterus is present.       Peritoneum/Retroperitoneum:  Aortoiliac atherosclerotic disease without   aneurysm.  No free fluid or free air in the abdomen.  Small amount of free   fluid in the pelvis.       Bones/Soft Tissues: Diffuse body wall edema.  Otherwise, soft tissues are unremarkable.  No destructive osseous lesions.           Impression   1. Patchy ground-glass opacities in the lung bases compatible with   atypical/viral pneumonia, and consistent with history of COVID-19 pneumonia. 2. Small bilateral pleural effusions and diffuse body wall edema.  Findings   compatible with CHF. 3. Retroperitoneal induration about the pancreas raising the possibility for   acute pancreatitis. 4. Normal appendix. 5. Stool throughout the colon in keeping with constipation. Principal Problem:    Anemia  Active Problems:    Type 2 diabetes mellitus with hyperglycemia (HCC)    Coronary artery disease involving native coronary artery of native heart without angina pectoris    Acute kidney injury ([address]    COVID-19    Alzheimer's disease ([facility] [address]    Asthma    Benign hypertension    Cardiac pacemaker in situ    Chronic diastolic heart failure (HCC)    COPD (chronic obstructive pulmonary disease) ([facility] [address]    History of cardiac arrest    Hypercholesterolemia    Long term current use of anticoagulant therapy    Presence of automatic implantable cardioverter-defibrillator    Ventricular tachycardia, polymorphic ([facility] [address]  Resolved Problems:    * No resolved hospital problems. *       GI Impression:    1. Anemia-stable. No active bleeding. 2. Abdominal pain-resolved  3. Elevated Transaminase-increasing-Iron panel shows Iron deficiency  AFP normal 1.3  4. Ceruloplasmin and Alpha 1 Antitrypsin WNL  5. Hx of Hep C      Plan and Recommendations:    1. No indications for endoscopy at this time. Hgb has stabilized. Pt had brown BM last night  2. Serologies still pending-will follow up  3. Pt will need to have MRI/MRCP and US as OP once cleared of COVID  4. Cont PPI IV BID, Carafate  5. Monitor for active bleeding  6. Daily labs  7. Rec IV Iron   8.  Will follow      This plan was formulated in collaboration with Dr. Delmer Luz MD Thank you for allowing me to participate in the care of your patient. Please feel free to contact me with any questions or concerns. [facility]    Attending Physician Statement  I have discussed the care of Opal Martinez and   I have examined the patient myselft independently, and taken ros and hpi , including pertinent history and exam findings,  with the author of this note . I have reviewed the key elements of all parts of the encounter with the nurse practitioner/resident.     I agree with the assessment, plan and orders as documented by the above health care provider     No sign of bleeding  But the liver enzymes are increasing cannot do any imaging on her because of the Covid but her CT scan was negative on admission and she has no abdominal pain whatsoever we will continue to watch and repeat them in the morning  If they are higher than we have to order an MRI MRCP on her    Electronically signed by Riaz Whitney MD

## 2020-12-30 NOTE — PLAN OF CARE
Problem: Skin Integrity:  Goal: Will show no infection signs and symptoms  Description: Will show no infection signs and symptoms  12/30/2020 0025 by Warren Carver RN  Outcome: Ongoing  12/29/2020 2153 by Sami Villela RN  Outcome: Ongoing  Goal: Absence of new skin breakdown  Description: Absence of new skin breakdown  12/30/2020 0025 by Warren Carver RN  Outcome: Ongoing  12/29/2020 2153 by Sami Villela RN  Outcome: Ongoing     Problem: Airway Clearance - Ineffective  Goal: Achieve or maintain patent airway  12/30/2020 0025 by Warren Carver RN  Outcome: Ongoing  12/29/2020 2153 by Sami Villela RN  Outcome: Ongoing     Problem: Gas Exchange - Impaired  Goal: Absence of hypoxia  12/30/2020 0025 by Warren Carver RN  Outcome: Ongoing  12/29/2020 2153 by Sami Villela RN  Outcome: Ongoing  Goal: Promote optimal lung function  12/30/2020 0025 by Warren Carver RN  Outcome: Ongoing  12/29/2020 2153 by Sami Villela RN  Outcome: Ongoing     Problem: Breathing Pattern - Ineffective  Goal: Ability to achieve and maintain a regular respiratory rate  12/30/2020 0025 by Warren Carver RN  Outcome: Ongoing  12/29/2020 2153 by Sami Villela RN  Outcome: Ongoing     Problem:  Body Temperature -  Risk of, Imbalanced  Goal: Ability to maintain a body temperature within defined limits  12/30/2020 0025 by Warren Carver RN  Outcome: Ongoing  12/29/2020 2153 by Sami Villela RN  Outcome: Ongoing  Goal: Will regain or maintain usual level of consciousness  12/30/2020 0025 by Warren Carver RN  Outcome: Ongoing  12/29/2020 2153 by Sami Villela RN  Outcome: Ongoing  Goal: Complications related to the disease process, condition or treatment will be avoided or minimized  12/30/2020 0025 by Warren Carver RN  Outcome: Ongoing  12/29/2020 2153 by Smai Villela RN  Outcome: Ongoing     Problem: Isolation Precautions - Risk of Spread of Infection  Goal: Prevent transmission of infection 12/30/2020 0025 by Olvin Singh RN  Outcome: Ongoing  12/29/2020 2153 by Leonel Posey RN  Outcome: Ongoing     Problem: Nutrition Deficits  Goal: Optimize nutrtional status  12/30/2020 0025 by Olvin Singh RN  Outcome: Ongoing  12/29/2020 2153 by Leonel Posey RN  Outcome: Ongoing     Problem: Risk for Fluid Volume Deficit  Goal: Maintain normal heart rhythm  12/30/2020 0025 by Olvin Singh RN  Outcome: Ongoing  12/29/2020 2153 by Leonel Posey RN  Outcome: Ongoing  Goal: Maintain absence of muscle cramping  12/30/2020 0025 by Olvin Singh RN  Outcome: Ongoing  12/29/2020 2153 by Leonel Posey RN  Outcome: Ongoing  Goal: Maintain normal serum potassium, sodium, calcium, phosphorus, and pH  12/30/2020 0025 by Olvin Singh RN  Outcome: Ongoing  12/29/2020 2153 by Leonel Posey RN  Outcome: Ongoing     Problem: Loneliness or Risk for Loneliness  Goal: Demonstrate positive use of time alone when socialization is not possible  12/30/2020 0025 by Olvin Singh RN  Outcome: Ongoing  12/29/2020 2153 by Leonel Posey RN  Outcome: Ongoing     Problem: Fatigue  Goal: Verbalize increase energy and improved vitality  12/30/2020 0025 by Olvin Singh RN  Outcome: Ongoing  12/29/2020 2153 by Leonel Posey RN  Outcome: Ongoing     Problem: Patient Education: Go to Patient Education Activity  Goal: Patient/Family Education  12/30/2020 0025 by Olvin Singh RN  Outcome: Ongoing  12/29/2020 2153 by Leonel Posey RN  Outcome: Ongoing     Problem: Falls - Risk of:  Goal: Will remain free from falls  Description: Will remain free from falls  12/30/2020 0025 by Olvin Singh RN  Outcome: Ongoing  12/29/2020 2153 by Leonel Posey RN  Outcome: Ongoing  Goal: Absence of physical injury  Description: Absence of physical injury  12/30/2020 0025 by Olvin Singh RN  Outcome: Ongoing  12/29/2020 2153 by Leonel Posey RN  Outcome: Ongoing     Problem: Pain: Goal: Pain level will decrease  Description: Pain level will decrease  12/30/2020 0025 by Sumanth Lr RN  Outcome: Ongoing  12/29/2020 2153 by Carmita Silvestre RN  Outcome: Ongoing  Goal: Control of acute pain  Description: Control of acute pain  12/30/2020 0025 by Sumanth Lr RN  Outcome: Ongoing  12/29/2020 2153 by Carmita Silvestre RN  Outcome: Ongoing  Goal: Control of chronic pain  Description: Control of chronic pain  12/30/2020 0025 by Sumanth Lr RN  Outcome: Ongoing  12/29/2020 2153 by Carmita Silvestre RN  Outcome: Ongoing

## 2020-12-30 NOTE — CONSULTS
[address], [address] [facility] [place]  [phone]               Cardiology Consult           Date of Admission:  12/28/2020  Date of Consultation:  12/30/2020      PCP:  Jarad Haile MD      Chief Complaint: Shortness of breath and fatigue    History of Present Illness:  Dago Khan is a 66 y.o. female who presents with shortness of breath and fatigue. Patient was exposed to COVID-19 by her grandson who lives with her. She subsequently tested positive. She noticed increasing shortness of breath and no significant cough but did develop abdominal pain. She denies diarrhea. She denies fevers chills. Patient was seen in the emergency room after contacting her physician who advised her to come in for evaluation. She was subsequently found to be very anemic with a hemoglobin around 6. She was subsequently transfused with 2 units of packed red blood cells. She was recently seen in our office by one of our nurse practitioners. Her device has been functioning normally. She has a history of AICD implantation for malignant ventricular arrhythmias. She was restarted on chronic oral anticoagulation because of her history of paroxysmal atrial fibrillation. She has had prior admissions where her hemoglobin has been very low. Most recent one was back in May of this year. At that time her hemoglobin got down as low as 4. PMH:   has a past medical history of Asthma, CHF (congestive heart failure) ([address] Chronic obstructive pulmonary disease with acute exacerbation ([address] Diabetes mellitus ([address] Gastroesophageal reflux disease without esophagitis, Hep C w/o coma, chronic ([address] PAF (paroxysmal atrial fibrillation) ([address] and Systolic and diastolic CHF, chronic ([address] PSH:   has a past surgical history that includes Pacemaker insertion; Colonoscopy (2020); Upper gastrointestinal endoscopy (2020); A-V cardiac pacemaker insertion (2016); Atrial ablation surgery (2017); and Tonsillectomy. Allergies: Allergies   Allergen Reactions    Codeine     Lisinopril Other (See Comments)     Angioedema    Percocet [Oxycodone-Acetaminophen]         Home Meds:    Prior to Admission medications    Medication Sig Start Date End Date Taking?  Authorizing Provider   pantoprazole (PROTONIX) 40 MG tablet Take 40 mg by mouth 2 times daily   Yes Historical Provider, MD   rivaroxaban (XARELTO) 20 MG TABS tablet Take 20 mg by mouth daily (with breakfast)   Yes Historical Provider, MD   memantine ER (NAMENDA XR) 14 MG CP24 extended release capsule Take 14 mg by mouth daily   Yes Historical Provider, MD   Fluticasone furoate-vilanterol (BREO ELLIPTA) 200-25 MCG/INH AEPB inhaler Inhale 1 puff into the lungs daily   Yes Historical Provider, MD   mirtazapine (REMERON) 15 MG tablet Take 15 mg by mouth nightly   Yes Historical Provider, MD   montelukast (SINGULAIR) 10 MG tablet Take 10 mg by mouth nightly   Yes Historical Provider, MD   bumetanide (BUMEX) 2 MG tablet Take 2 mg by mouth daily   Yes Historical Provider, MD   donepezil (ARICEPT) 10 MG tablet Take 10 mg by mouth nightly   Yes Historical Provider, MD   vitamin D (ERGOCALCIFEROL) 1.25 MG (17383 UT) CAPS capsule Take 50,000 Units by mouth once a week   Yes Historical Provider, MD   carvedilol (COREG) 25 MG tablet Take 25 mg by mouth 2 times daily (with meals)   Yes Historical Provider, MD   albuterol sulfate  (90 Base) MCG/ACT inhaler Inhale 2 puffs into the lungs every 4 hours as needed    Yes Historical Provider, MD   amLODIPine (NORVASC) 10 MG tablet Take 10 mg by mouth daily 5/11/18  Yes Historical Provider, MD ferrous sulfate 325 (65 Fe) MG tablet Take 325 mg by mouth 2 times daily (with meals)  1/25/18  Yes Historical Provider, MD   fluticasone (FLONASE) 50 MCG/ACT nasal spray 1 spray by Nasal route 2 times daily   Yes Historical Provider, MD   folic acid (FOLVITE) 1 MG tablet Take 1 tablet by mouth daily    Yes Historical Provider, MD   hydrALAZINE (APRESOLINE) 50 MG tablet Take 50 mg by mouth 3 times daily  9/1/17  Yes Historical Provider, MD   mexiletine (MEXITIL) 150 MG capsule Take 150 mg by mouth 3 times daily  10/4/17  Yes Historical Provider, MD   potassium chloride (KLOR-CON M) 20 MEQ extended release tablet Take 20 mEq by mouth daily    Yes Historical Provider, MD   probenecid (BENEMID) 500 MG tablet Take 500 mg by mouth 2 times daily    Yes Historical Provider, MD   magnesium oxide (MAG-OX) 400 (240 Mg) MG tablet Take 400 mg by mouth daily  1/21/18  Yes Historical Provider, MD   isosorbide dinitrate (ISORDIL) 20 MG tablet Take 20 mg by mouth 3 times daily    Yes Historical Provider, MD        [facility] Meds:    Current Facility-Administered Medications   Medication Dose Route Frequency Provider Last Rate Last Admin    potassium chloride (KLOR-CON M) extended release tablet 40 mEq  40 mEq Oral PRN Joe Pineda MD   40 mEq at 12/30/20 1100    Or    potassium bicarb-citric acid (EFFER-K) effervescent tablet 40 mEq  40 mEq Oral PRN Joe Pineda MD        Or    potassium chloride 10 mEq/100 mL IVPB (Peripheral Line)  10 mEq Intravenous PRN Joe Pineda MD        potassium chloride (KLOR-CON M) extended release tablet 20 mEq  20 mEq Oral Once Rhett Tipton MD        magnesium oxide (MAG-OX) tablet 400 mg  400 mg Oral TID Evaline Andrew, APRN - NP   400 mg at 12/30/20 1327    ferrous sulfate (FE TABS 325) EC tablet 325 mg  325 mg Oral Daily with breakfast Evaline Andrew APRN - NP   325 mg at 12/30/20 5750  atorvastatin (LIPITOR) tablet 10 mg  10 mg Oral Nightly Deyvifield Uma, APRN - NP   10 mg at 65/27/21 4732    folic acid (FOLVITE) tablet 1 mg  1 mg Oral Daily Janeth TarangoJames, APRN - NP   1 mg at 12/30/20 8386    isosorbide dinitrate (ISORDIL) tablet 10 mg  10 mg Oral TID Deyvifield Uma, APRN - NP   10 mg at 12/30/20 1327    acetaminophen (TYLENOL) tablet 650 mg  650 mg Oral Q6H PRN Janeth James, APRN - NP        Or    acetaminophen (TYLENOL) suppository 650 mg  650 mg Rectal Q6H PRN Janeth James, APRN - NP        dexamethasone (DECADRON) tablet 6 mg  6 mg Oral Daily [facility] APR[facility] - NP   6 mg at 12/30/20 0851    amLODIPine (NORVASC) tablet 10 mg  10 mg Oral Daily Rosmery Mcduffie MD   10 mg at 12/30/20 0915    fluticasone (FLONASE) 50 MCG/ACT nasal spray 1 spray  1 spray Nasal BID Kip Piña APRN - NP   1 spray at 12/30/20 0852    insulin lispro (HUMALOG) injection vial 0-6 Units  0-6 Units Subcutaneous TID WC Kip Piña APRN - NP   2 Units at 12/30/20 1153    insulin lispro (HUMALOG) injection vial 0-3 Units  0-3 Units Subcutaneous Nightly Kip Blind, APRN - NP   1 Units at 12/29/20 2156    glucose (GLUTOSE) 40 % oral gel 15 g  15 g Oral PRN Kip Piña, APRN - NP        dextrose 50 % IV solution  12.5 g Intravenous PRN Kip Piña APRN - NP        glucagon (rDNA) injection 1 mg  1 mg Intramuscular PRN Kip Blind, APRN - NP        dextrose 5 % solution  100 mL/hr Intravenous PRN Kip Piña, APRN - NP        pantoprazole (PROTONIX) injection 40 mg  40 mg Intravenous BID Kip Piña APRN - NP   40 mg at 12/30/20 0851    albuterol sulfate  (90 Base) MCG/ACT inhaler 2 puff  2 puff Inhalation Q6H PRN CHRIS Llamas - NP   2 puff at 12/30/20 1526    ipratropium (ATROVENT HFA) 17 MCG/ACT inhaler 2 puff  2 puff Inhalation 4x Daily Mendoza Daley, APRN - NP   2 puff at 12/30/20 7628  furosemide (LASIX) tablet 40 mg  40 mg Oral Daily Aldo Oliver APRN - NP   40 mg at 12/30/20 0851    carvedilol (COREG) tablet 3.125 mg  3.125 mg Oral BID JERONIMO Church MD   3.125 mg at 12/30/20 0851    sucralfate (CARAFATE) tablet 1 g  1 g Oral 4 times per day CHRIS Garza - CNP   1 g at 12/30/20 1157       Social History:       TOBACCO:   reports that she has quit smoking. She has never used smokeless tobacco.  ETOH:   reports no history of alcohol use. DRUGS:  reports no history of drug use. OCCUPATION:          Family Histroy:         Problem Relation Age of Onset    Diabetes Mother     Diabetes Father            Review of Systems:   · Constitutional: there has been no unanticipated weight loss. There's been no change in energy level, sleep pattern, or activity level. · Eyes: No visual changes or diplopia. No scleral icterus. · ENT: No Headaches, hearing loss or vertigo. No mouth sores or sore throat. · Cardiovascular: Please see HPI  · Respiratory: No cough or wheezing, no sputum production. No hematemesis. · Gastrointestinal: No abdominal pain, appetite loss, blood in stools. No change in bowel or bladder habits. · Genitourinary: No dysuria, trouble voiding, or hematuria. · Musculoskeletal:  No gait disturbance, weakness or joint complaints. · Integumentary: No rash or pruritis. · Neurological: No headache, diplopia, change in muscle strength, numbness or tingling. No change in gait, balance, coordination, mood, affect, memory, mentation, behavior. · Psychiatric: No anxiety, or depression. · Endocrine: No temperature intolerance. No excessive thirst, fluid intake, or urination. No tremor. · Hematologic/Lymphatic: No abnormal bruising or bleeding, blood clots or swollen lymph nodes. · Allergic/Immunologic: No nasal congestion or hives.        Physical Exam Vital Signs: BP (!) 145/56   Pulse 60   Temp 98.7 °F (37.1 °C) (Oral)   Resp 18   Ht 5' 6\" (1.676 m)   Wt 190 lb (86.2 kg)   SpO2 96%   BMI 30.67 kg/m²  O2 Flow Rate (L/min): 1 L/min     Admission Weight: 190 lb (86.2 kg)     General appearance: Awake, Alert Cooperative    Head: Normocephalic    Eyes: Extraocular movements intact    Neck: No obvious JVD. No bruits. Supple    Lungs: Dull in bilateral bases but otherwise clear. Heart: Regular S1-S2 with no murmurs. Abdomen: Soft    Extremities: No significant clubbing cyanosis or edema    Skin: Warm and dry    Neurologic: Grossly normal            Labs:      CBC:   Recent Labs     12/28/20 1710 12/28/20  1710 12/29/20 0617 12/29/20 0617 12/29/20  1644 12/30/20  0051 12/30/20  0635   WBC 5.5  --  4.5  --   --   --  7.3   HGB 7.3*   < > 7.9*   < > 7.9* 7.8* 7.8*   HCT 24.2*   < > 25.8*   < > 25.5* 24.6* 25.2*   MCV 90.3  --  89.3  --   --   --  88.1     --  190  --   --   --  184    < > = values in this interval not displayed. BMP:   Recent Labs     12/28/20 1710 12/29/20 0617 12/30/20  0635    141 143   K 4.1 3.9 3.4*   * 107 108*   CO2 22 23 26   BUN 25* 18 20   CREATININE 1.26* 0.99* 0.98*     PT/INR: No results for input(s): PROTIME, INR in the last 72 hours. APTT: No results for input(s): APTT in the last 72 hours. MAG:   Recent Labs     12/29/20  0617 12/30/20  0635   MG 2.3 2.1     D Dimer:   Recent Labs     12/28/20 1710 12/29/20 0617 12/30/20  0635   DDIMER 3.64* 8.60* 16.90*     Troponin T No results for input(s): TROPHS in the last 72 hours.   ProBNP Invalid input(s): PRO-BNP          Diagnosis:  Principal Problem:    Anemia  Active Problems:    Type 2 diabetes mellitus with hyperglycemia (HCC)    Coronary artery disease involving native coronary artery of native heart without angina pectoris    Acute kidney injury ([facility]    COVID-19    Alzheimer's disease ([facility]    Asthma    Benign hypertension Cardiac pacemaker in situ    Chronic diastolic heart failure (HCC)    COPD (chronic obstructive pulmonary disease) (HCC)    History of cardiac arrest    Hypercholesterolemia    Long term current use of anticoagulant therapy    Presence of automatic implantable cardioverter-defibrillator    Ventricular tachycardia, polymorphic (HCC)  Resolved Problems:    * No resolved hospital problems. *    #1 presumably acute blood loss anemia. Patient has been transfused with 2 units of packed red cells. GI work-up is not felt to be necessary at this time as patient recently underwent endoscopy. We would advise discontinuing Xarelto at this time. 2.  Paroxysmal atrial fibrillation. Patient is sinus rhythm at this time. We will discontinue Xarelto. When patient is discharged we will arrange for follow-up for consideration of a watchman device. I have discussed this with the patient and she seems to be interested in pursuing this. 3.  History of chronic diastolic congestive heart failure. Patient probably has some element of acute on chronic diastolic heart failure with elevated pro BNP on admission. Patient's fluid status appears to be relatively euvolemic at this time. She is still receiving diuretics. 4.  History of ventricular tachycardia and status post dual-chamber [address] [place] [address] [place] which is functioning normally. Patient is also taking mexiletine for arrhythmia suppression. 5.  Chronic coronary artery disease of native vessels without angina pectoris. Plan:  #1 we will recommend discontinuing Xarelto. 2.  Patient will follow up with us in the office for consideration of a watchman device.         Electronically signed by Chrissie Griffin MD on 12/30/2020 at 5:59 PM

## 2020-12-30 NOTE — PROGRESS NOTES
[facility]  Office: [address], DO, Nikolai Callaway, DO, Riaz Johnson, DO, Ward Rodriguez, DO, Ricarda Mcgraw MD, Marry Bustamante MD, Quoc Gonzales MD, Daisy Nice MD, Murphy Betts MD, Malvin Perla MD, Donis Gee MD, Nicolas Bryson MD, Arpit Norris MD, Lei Mccullough, DO, Alvaro Gomes MD, Magnolia Meyer MD, Ortega Ralph, DO, Chip Flannery MD,  Robert Lazo, DO, Anurag Stafford MD, Tlyer Medina MD, Kem Oh, [facility], [facility], [facility], Tylor Lagos, CNP, Wendy Earl, CNS, Wanda Lopes, CNP, Joseph Forbes, CNP, Maeve Keith, CNP, Luzmaria Sebastian, CNP, Ryan Pastor, CNP, Harjeet Egan PA-C, Radha Shin, [facility], Edith Chanel, CNP, Jimmy Murray, CNP, Sarbjit Multani, CNP, Shahram Leon, [facility], Tonlarolf Doyle, [facility]    Progress Note    12/30/2020    12:21 PM    Name:   Renee Shelton  MRN:     [id number]     Acct:      [de-identified]   Room:   [facility]/1004-02   Day:  2  Admit Date:  12/28/2020 12:13 PM    PCP:   Kirti Gil MD  Code Status:  Prior    Subjective:     C/C:   Chief Complaint   Patient presents with    Abdominal Pain     pt has been exposed to covid     Interval History Status: improved. Patient was seen and evaluated at bedside this morning. She reports that she is feeling much better this morning. Patient denies any complaints. Patient denies any trouble with her breathing or cough.     Brief History: This is a 64-year-old female with past medical history of congestive heart failure, type 2 diabetes, asthma, COPD, polymorphic V. fib arrest, PAF status post AICD placement, GERD, CKD stage III who came to emergency department with complaints of abdominal pain. Patient was recently exposed to COVID-19 and was found to be positive upon admission as well. She was noted to have hemoglobin of 6 upon admission. Patient does follow-up with paramedical GI group. Patient was also noted to have acute on chronic kidney injury. Review of Systems:     Constitutional:  negative for chills, fevers, sweats  Respiratory:  negative for cough, dyspnea on exertion, shortness of breath, wheezing  Cardiovascular:  negative for chest pain, chest pressure/discomfort, lower extremity edema, palpitations  Gastrointestinal:  negative for abdominal pain, constipation, diarrhea, nausea, vomiting  Neurological:  negative for dizziness, headache    Medications: Allergies:     Allergies   Allergen Reactions    Codeine     Lisinopril Other (See Comments)     Angioedema    Percocet [Oxycodone-Acetaminophen]        Current Meds:   Scheduled Meds:    magnesium oxide  400 mg Oral TID    ferrous sulfate  325 mg Oral Daily with breakfast    atorvastatin  10 mg Oral Nightly    folic acid  1 mg Oral Daily    isosorbide dinitrate  10 mg Oral TID    dexamethasone  6 mg Oral Daily    amLODIPine  10 mg Oral Daily    fluticasone  1 spray Nasal BID    insulin lispro  0-6 Units Subcutaneous TID WC    insulin lispro  0-3 Units Subcutaneous Nightly    pantoprazole  40 mg Intravenous BID    ipratropium  2 puff Inhalation 4x Daily    furosemide  40 mg Oral Daily    carvedilol  3.125 mg Oral BID WC    sucralfate  1 g Oral 4 times per day     Continuous Infusions:    dextrose PRN Meds: potassium chloride **OR** potassium alternative oral replacement **OR** potassium chloride, acetaminophen **OR** acetaminophen, glucose, dextrose, glucagon (rDNA), dextrose, albuterol sulfate HFA    Data:     Past Medical History:   has a past medical history of Asthma, CHF (congestive heart failure) ([facility] Chronic obstructive pulmonary disease with acute exacerbation ([facility] Diabetes mellitus ([facility] Gastroesophageal reflux disease without esophagitis, Hep C w/o coma, chronic ([facility] PAF (paroxysmal atrial fibrillation) ([facility] and Systolic and diastolic CHF, chronic ([facility] Social History:   reports that she has quit smoking. She has never used smokeless tobacco. She reports that she does not drink alcohol or use drugs. Family History:   Family History   Problem Relation Age of Onset    Diabetes Mother     Diabetes Father        Vitals:  BP (!) 129/48   Pulse 60   Temp 98.4 °F (36.9 °C) (Axillary)   Resp 18   Ht 5' 6\" (1.676 m)   Wt 190 lb (86.2 kg)   SpO2 96%   BMI 30.67 kg/m²   Temp (24hrs), Av.8 °F (36.6 °C), Min:97.2 °F (36.2 °C), Max:98.4 °F (36.9 °C)    Recent Labs     20  1649 20  2050 20  0708 20  1110   POCGLU 151* 213* 139* 240*       I/O (24Hr):     Intake/Output Summary (Last 24 hours) at 2020 1221  Last data filed at 2020 7694  Gross per 24 hour   Intake 300 ml   Output 1175 ml   Net -875 ml       Labs:  Hematology:  Recent Labs     20  1710 20  1710 20  0617 20  0617 20  1644 20  0051 20  0635   WBC 5.5  --  4.5  --   --   --  7.3   RBC 2.68*  --  2.89*  --   --   --  2.86*   HGB 7.3*   < > 7.9*   < > 7.9* 7.8* 7.8*   HCT 24.2*   < > 25.8*   < > 25.5* 24.6* 25.2*   MCV 90.3  --  89.3  --   --   --  88.1   MCH 27.2  --  27.3  --   --   --  27.3   MCHC 30.2  --  30.6  --   --   --  31.0   RDW 16.9*  --  16.8*  --   --   --  16.9*     --  190  --   --   --  184 MPV 11.9  --  11.7  --   --   --  11.6   CRP  --   --  <3.0  --   --   --   --    DDIMER 3.64*  --  8.60*  --   --   --  16.90*    < > = values in this interval not displayed. Chemistry:  Recent Labs     12/28/20  1230 12/28/20  1230 12/28/20  1710 12/29/20  0617 12/30/20  0635     --  139 141 143   K 4.1  --  4.1 3.9 3.4*     --  108* 107 108*   CO2 23  --  22 23 26   GLUCOSE 241*   < > 157* 140* 141*   BUN 29*  --  25* 18 20   CREATININE 1.72*  --  1.26* 0.99* 0.98*   MG  --   --   --  2.3 2.1   ANIONGAP 13  --  9 11 9   LABGLOM 29*  --  41* 54* 55*   GFRAA 35*  --  50* >60 >60   CALCIUM 8.1*  --  7.7* 8.0* 8.2*   PROBNP 12,509*  --   --   --   --    LACTACIDWB  --   --   --  NOT REPORTED  --     < > = values in this interval not displayed.      Recent Labs     12/28/20  1230 12/28/20  1230 12/28/20  1710 12/28/20  2244 12/29/20  0617 12/29/20  1102 12/29/20  1352 12/29/20  1649 12/29/20  2050 12/30/20  0635 12/30/20  0708 12/30/20  1110   PROT  --   --  5.9*  6.5  --  6.5  --   --   --   --  6.3*  --   --    LABALBU  --   --  3.0*  --  2.7*  --   --   --   --  2.7*  --   --    AST  --   --  82*  --  81*  --   --   --   --  92*  --   --    ALT  --   --  52*  --  67*  --   --   --   --  110*  --   --    *  --   --   --   --   --   --   --   --   --   --   --    ALKPHOS  --   --  63  --  72  --   --   --   --  86  --   --    BILITOT  --   --  0.44  --  0.42  --   --   --   --  0.39  --   --    AMYLASE  --   --   --   --  61  --   --   --   --   --   --   --    LIPASE  --   --   --   --  30  --   --   --   --   --   --   --    CHOL  --   --   --   --   --   --  156  --   --   --   --   --    HDL  --   --   --   --   --   --  46  --   --   --   --   --    LDLCHOLESTEROL  --   --   --   --   --   --  89  --   --   --   --   --    CHOLHDLRATIO  --   --   --   --   --   --  3.4  --   --   --   --   --    TRIG  --   --   --   --   --   --  104  --   --   --   --   -- VLDL  --   --   --   --   --   --  NOT REPORTED  --   --   --   --   --    POCGLU  --    < >  --  167*  --  147*  --  151* 213*  --  139* 240*    < > = values in this interval not displayed. ABG:  Lab Results   Component Value Date    POCPH 7.47 12/28/2020    PHART 7.380 02/07/2016    POCPCO2 31 12/28/2020    BCT5ECA 36.0 02/07/2016    POCPO2 62 12/28/2020    PO2ART 118.0 02/07/2016    POCHCO3 22.7 12/28/2020    XST5TJX 20.8 02/07/2016    NBEA 1 12/28/2020    PBEA NOT REPORTED 12/28/2020    ZYZ0NVJ 24 12/28/2020    MQZM6FFV 93 12/28/2020    K9CDEOTS 98.5 02/07/2016    FIO2 28.0 12/28/2020     Lab Results   Component Value Date/Time    SPECIAL NOT REPORTED 12/28/2020 05:20 PM    SPECIAL NOT REPORTED 12/28/2020 05:20 PM     Lab Results   Component Value Date/Time    CULTURE NO GROWTH 2 DAYS 12/28/2020 05:20 PM    CULTURE NO GROWTH 2 DAYS 12/28/2020 05:20 PM       Radiology:  Ct Abdomen Pelvis Wo Contrast Additional Contrast? Oral    Result Date: 12/29/2020  1. Patchy ground-glass opacities in the lung bases compatible with atypical/viral pneumonia, and consistent with history of COVID-19 pneumonia. 2. Small bilateral pleural effusions and diffuse body wall edema. Findings compatible with CHF. 3. Retroperitoneal induration about the pancreas raising the possibility for acute pancreatitis. 4. Normal appendix. 5. Stool throughout the colon in keeping with constipation. Nm Lung Vent/perfusion (vq)    Result Date: 12/28/2020  Low Probability for Pulmonary Embolus. Us Renal Complete    Result Date: 12/29/2020  Negative examination as discussed. Xr Chest Portable    Result Date: 12/28/2020  Increased interstitial changes throughout the lungs, possibly infiltrate or edema. Mild cardiomegaly.        Physical Examination:        General appearance:  alert, cooperative and no distress  Mental Status:  oriented to person, place and time and normal affect  Lungs:  clear to auscultation bilaterally, normal effort Heart:  regular rate and rhythm, no murmur  Abdomen:  soft, nontender, nondistended, normal bowel sounds, no masses, hepatomegaly, splenomegaly  Extremities:  no edema, redness, tenderness in the calves  Skin:  no gross lesions, rashes, induration    Assessment:        Hospital Problems           Last Modified POA    * (Principal) Anemia 12/28/2020 Yes    Type 2 diabetes mellitus with hyperglycemia ([address] 12/28/2020 Yes    Coronary artery disease involving native coronary artery of native heart without angina pectoris 12/28/2020 Yes    Acute kidney injury ([address] 12/28/2020 Yes    COVID-19 12/28/2020 Yes    Alzheimer's disease ([address] 12/28/2020 Yes    Asthma 12/28/2020 Yes    Benign hypertension 12/28/2020 Yes    Cardiac pacemaker in situ 12/28/2020 Yes    Chronic diastolic heart failure ([address] 12/28/2020 Yes    COPD (chronic obstructive pulmonary disease) ([address] 12/28/2020 Yes    History of cardiac arrest 12/28/2020 Yes    Hypercholesterolemia 12/28/2020 Yes    Long term current use of anticoagulant therapy 12/28/2020 Yes    Presence of automatic implantable cardioverter-defibrillator 12/28/2020 Yes    Ventricular tachycardia, polymorphic ([address] 12/28/2020 Yes          Plan:          Anemia  -Known history of iron deficiency anemia  -GI on board, recent colonoscopy done in October 2020 with polypectomy. Last EGD showed chronic blood loss anemia, gastritis and GERD. Patient did receive 2 units of PRBCs.   Will hold aspirin and Xarelto at this time.  -Liver serology, liver ultrasound and AFP ordered  -Outpatient MRI/MRCP  -Continue PPI twice daily and Carafate  -Continue ferrous sulfate    LAN on CKD III  -Likely secondary ischemic ATN secondary to acute anemia  -Baseline creatinine 1.1-1.2, trending down  -Continue Lasix  -Nephrology on board    Elevated D-dimer  -VQ scan in lower extremity duplex ordered  -Low probability of PE and negative for DVT    History of V. tach/V. fib arrest/PAF -AICD dual-chamber placement in 2016 with follow-up cardiac ablation in 2017  -Cardiology consulted    Acute on chronic mild systolic and diastolic CHF  -Continue diuretic therapy  -Cardiology consulted  -Echo from May 2020 shows ejection fraction of 45 to 50% with moderate MR, severe TR, mild pulmonary hypertension  -Follow-up echocardiogram    Abdominal pain  -Resolved    COVID-19 pneumonia  -Continue Decadron 2 of 10 and plasma    Lactic acidosis  -Resolving    Type 2 diabetes  -Continue sliding scale insulin    COPD  -Continue home medications    Hypertension  -Continue amlodipine, Coreg    Hyperlipidemia  -Continue Lipitor    Joe Pineda MD  12/30/2020  12:21 PM

## 2020-12-30 NOTE — PROGRESS NOTES
Physical Therapy    Facility/Department: [facility]  Initial Assessment    NAME: [facility]  : 1945  MRN: [id number]    Date of Service: 2020    Discharge Recommendations:  Home with [address] PT    Pt presented to ED on 20 for evaluation of shortness of breath. Patient states that he has not felt well for the last 2 weeks. She thought her grandsons girlfriend was positive for COVID-19. She had exposure to her. She states that she called her lung specialist doctor Nadiya Lezama and he started her on some prednisone which she states has not been helping. She states that she has had chills. She denies any vomiting or diarrhea. Pt underwent Covid testing & specimen  POSITIVE for SARS-Cov-2, the novel coronavirus associated with COVID-19. RN reports patient is medically stable for therapy treatment this date. Chart reviewed prior to treatment and patient is agreeable for therapy. Assessment   Body structures, Functions, Activity limitations: Decreased functional mobility ; Decreased endurance;Decreased balance;Decreased safe awareness;Decreased posture  Assessment: Pt tolerated session well with deficits noted in bed mobility, transfers, ambulation, safety awareness, balance, and endurance this session. Pt requires continued IP PT & is appropriate to D/C Home with assist & HH PT to maximize independence with functional mobility, balance, safety awareness & activity tolerance.   Prognosis: Good  Decision Making: Medium Complexity  Exam: ROM, MMT, functional mobility, activity tolerance, Balance, & [employer] AM-PAC 6 Clicks Basic Mobility  Clinical Presentation: evolving  PT Education: Goals;PT Role;Plan of Care;Disease Specific Education;Transfer Training;Functional Mobility Training;Gait Training Patient Education: Ed optimal breathing techniques, UE/LE ex's with incorporation of breathing techniques, posture & positioning for optimal breathing &  mental training ex's & issue written pt education  REQUIRES PT FOLLOW UP: Yes  Activity Tolerance  Activity Tolerance: Patient limited by endurance       Patient Diagnosis(es): The primary encounter diagnosis was COVID-19. Diagnoses of Anemia, unspecified type, Hypoxia, and Acute kidney injury [facility]) were also pertinent to this visit. has a past medical history of Asthma, CHF (congestive heart failure) ([facility] Chronic obstructive pulmonary disease with acute exacerbation ([facility] Diabetes mellitus ([facility] Gastroesophageal reflux disease without esophagitis, Hep C w/o coma, chronic (H[facility]), PAF (paroxysmal atrial fibrillation) ([facility] and Systolic and diastolic CHF, chronic ([facility] has a past surgical history that includes Pacemaker insertion; Colonoscopy (2020); Upper gastrointestinal endoscopy (2020); A-V cardiac pacemaker insertion (2016); Atrial ablation surgery (2017); and Tonsillectomy.     Restrictions  Restrictions/Precautions  Restrictions/Precautions: General Precautions, Fall Risk, Isolation  Position Activity Restriction  Other position/activity restrictions: droplet plus isiolation 2* +Covid-19, 1LO2 via NC, continuous pulse ox  Vision/Hearing  Vision: Impaired  Vision Exceptions: Wears glasses for reading  Hearing: Within functional limits     Subjective  General  Chart Reviewed: Yes  Patient assessed for rehabilitation services?: Yes  Additional Pertinent Hx: systolic and diastolic CHF, diabetes mellitus type 2, asthma, COPD, polymorphic V. fib arrest, PAF-status post AICD placement, GERD, CKD stage III  Response To Previous Treatment: Not applicable  General Comment  Comments: RN okays PT  Subjective  Subjective: Pt agreeable to PT  Pain Screening  Patient Currently in Pain: Denies  Vital Signs  Patient Currently in Pain: Denies       Orientation Orientation  Overall Orientation Status: Within Normal Limits  Social/Functional History  Social/Functional History  Lives With: Spouse  Type of Home: House  Home Layout: One level  Home Access: Level entry  Bathroom Shower/Tub: Tub/Shower unit, Walk-in shower(uses WIS)  Bathroom Toilet: Standard  Bathroom Equipment: Grab bars in shower, Shower chair, Grab bars around toilet  Home Equipment: Poonam Muscat  ADL Assistance: [address]: Independent  Homemaking Responsibilities: Yes  Ambulation Assistance: Independent(uses cane PRN)  Transfer Assistance: Independent  Active : Yes  Mode of Transportation: Car  Occupation: Retired  Type of occupation: social work, health   Leisure & Hobbies: read  Additional Comments: denies falls  Cognition   Cognition  Overall Cognitive Status: Exceptions  Arousal/Alertness: Appropriate responses to stimuli  Following Commands:  Follows all commands without difficulty  Attention Span: Appears intact  Memory: Appears intact  Safety Judgement: Decreased awareness of need for safety;Decreased awareness of need for assistance  Problem Solving: Decreased awareness of errors;Assistance required to correct errors made  Insights: Decreased awareness of deficits  Initiation: Does not require cues  Sequencing: Does not require cues    Objective     Observation/Palpation  Posture: Good  Observation: resting in bed, wearing NC O@, 1L & saO2 at 96%    AROM RLE (degrees)  RLE AROM: WFL  AROM LLE (degrees)  LLE AROM : WFL  AROM RUE (degrees)  RUE AROM : WFL  AROM LUE (degrees)  LUE AROM : WFL  Strength RLE  Strength RLE: WFL  Strength LLE  Strength LLE: WFL  Strength RUE  Strength RUE: WFL  Strength LUE  Strength LUE: WFL  Tone RLE  RLE Tone: Normotonic  Tone LLE  LLE Tone: Normotonic  Coordination  Movements Are Fluid And Coordinated: Yes  Sensation  Overall Sensation Status: WFL  Bed mobility  Rolling to Left: Supervision  Rolling to Right: Supervision Supine to Sit: Stand by assistance  Scooting: Stand by assistance  Comment: good use of UB, Ed PLB techniques   Pt sat at EOB for 6 minutes to rest after bed mobility & prepare lines for standing & ambulation. Transfers  Sit to Stand: Contact guard assistance  Stand to sit: Contact guard assistance  Bed to Chair: Contact guard assistance  Stand Pivot Transfers: Contact guard assistance  Lateral Transfers: Contact guard assistance  Comment: Ed on correct use of upper body for safe sit/stand  Ambulation  Ambulation?: Yes  More Ambulation?: Yes  Ambulation 1  Surface: level tile  Device: No Device  Assistance: Minimal assistance  Quality of Gait: step through pattern  Distance: 30ft, Pt amb to BR for toileting, Contact Guard Assistance to sit to toilet. Pt stood with Contact Guard Assistance,stood 3 minutes for pericare & to pull up brief, amb 2ft to sink for hand hygiene x 2 minutes       Ambulation 2  Surface - 2: level tile  Device 2: No device  Other Apparatus 2: O2  Assistance 2: Minimal assistance  Quality of Gait 2: step through pattern, limited aerobic capacity, saO2 dropped to 87% on 1L  Distance: 60ftx2        Exercises  Comments: Ed optimal breathing techniques, UE/LE ex's with incorporation of breathing techniques, posture & positioning for optimal breathing &  mental training ex's & issue written pt education    All lines intact, call light within reach, and patient positioned comfortably at end of treatment. All patient needs addressed prior to ending therapy session.         Plan   Plan  Times per week: 1-2x/d, 5-6D/week  Current Treatment Recommendations: Strengthening, Balance Training, Functional Mobility Training, Transfer Training, Endurance Training, Gait Training, Home Exercise Program, Patient/Caregiver Education & Training, Safety Education & Training  Safety Devices  Type of devices: Call light within reach, Gait belt, Patient at risk for falls, Left in chair, Nurse notified    G-Code OutComes Score                                                  AM-PAC Score  AM-PAC Inpatient Mobility Raw Score : 17 (12/30/20 1435)  AM-PAC Inpatient T-Scale Score : 42.13 (12/30/20 1435)  Mobility Inpatient CMS 0-100% Score: 50.57 (12/30/20 1435)  Mobility Inpatient CMS G-Code Modifier : CK (12/30/20 1435)          Goals  Short term goals  Time Frame for Short term goals: 12 visits  Short term goal 1: Inc bed-mobility & transfers to independent to enable pt to safely get in/OOB  Short term goal 2: Inc gait to amb 350ft or > indep w/ cane to enable pt to return to previous level of independence  Short term goal 3:  Inc standing balance to good & able to tolerate 30-40 min of activity to include 15-20 reps of ex & functional mobility of at least 5 minutes standing to facilitate activity tolerance to [facility] & pt independence for performance of ADL's & functional mobilit with reduced fall risk  Short term goal 4: Ed breathing techniques, energy principles, ex's incorporating breathing techniques,posture & positioning for optimal breathing &  mental training ex's & issue written pt education       Therapy Time   Individual Concurrent Group Co-treatment   Time In 1352         Time Out 1445         Minutes [phone]              Additional 10 minutes for chart review        [address], PT

## 2020-12-30 NOTE — PLAN OF CARE
Problem: Gas Exchange - Impaired  Goal: Absence of hypoxia  12/30/2020 0025 by Charly Amin RN  Outcome: Ongoing     Problem: Gas Exchange - Impaired  Goal: Promote optimal lung function  12/30/2020 0025 by Charly Amin RN  Outcome: Ongoing     Problem: Breathing Pattern - Ineffective  Goal: Ability to achieve and maintain a regular respiratory rate  12/30/2020 0025 by Charly Amin RN  Outcome: Ongoing

## 2020-12-30 NOTE — PROGRESS NOTES
Pt arrival to room 1004 from ICU at 2240. Pt alert and oriented, VSS. No c/o pain or shortness of breath at this time. Wheels locked, call light close. Pt understands how to use call light. Assessment done, commode obtained as well as continuous pulse ox machine.  Pt on 3L n.c.

## 2020-12-31 VITALS
HEIGHT: 66 IN | RESPIRATION RATE: 18 BRPM | SYSTOLIC BLOOD PRESSURE: 149 MMHG | DIASTOLIC BLOOD PRESSURE: 61 MMHG | OXYGEN SATURATION: 98 % | TEMPERATURE: 97.7 F | BODY MASS INDEX: 30.53 KG/M2 | WEIGHT: 190 LBS | HEART RATE: 66 BPM

## 2020-12-31 LAB
ABSOLUTE EOS #: 0 K/UL (ref 0–0.44)
ABSOLUTE IMMATURE GRANULOCYTE: 0.08 K/UL (ref 0–0.3)
ABSOLUTE LYMPH #: 0.66 K/UL (ref 1.1–3.7)
ABSOLUTE MONO #: 0.49 K/UL (ref 0.1–1.2)
ALBUMIN SERPL-MCNC: 2.8 G/DL (ref 3.5–5.2)
ALBUMIN/GLOBULIN RATIO: ABNORMAL (ref 1–2.5)
ALP BLD-CCNC: 90 U/L (ref 35–104)
ALT SERPL-CCNC: 99 U/L (ref 5–33)
ANION GAP SERPL CALCULATED.3IONS-SCNC: 7 MMOL/L (ref 9–17)
AST SERPL-CCNC: 52 U/L
BASOPHILS # BLD: 0 % (ref 0–2)
BASOPHILS ABSOLUTE: 0 K/UL (ref 0–0.2)
BILIRUB SERPL-MCNC: 0.38 MG/DL (ref 0.3–1.2)
BUN BLDV-MCNC: 21 MG/DL (ref 8–23)
BUN/CREAT BLD: 24 (ref 9–20)
CALCIUM SERPL-MCNC: 8.7 MG/DL (ref 8.6–10.4)
CHLORIDE BLD-SCNC: 111 MMOL/L (ref 98–107)
CO2: 24 MMOL/L (ref 20–31)
CREAT SERPL-MCNC: 0.89 MG/DL (ref 0.5–0.9)
D-DIMER QUANTITATIVE: 17.89 MG/L FEU (ref 0–0.59)
DIFFERENTIAL TYPE: ABNORMAL
EOSINOPHILS RELATIVE PERCENT: 0 % (ref 1–4)
FIBRINOGEN: 317 MG/DL (ref 179–518)
GFR AFRICAN AMERICAN: >60 ML/MIN
GFR NON-AFRICAN AMERICAN: >60 ML/MIN
GFR SERPL CREATININE-BSD FRML MDRD: ABNORMAL ML/MIN/{1.73_M2}
GFR SERPL CREATININE-BSD FRML MDRD: ABNORMAL ML/MIN/{1.73_M2}
GLUCOSE BLD-MCNC: 131 MG/DL (ref 65–105)
GLUCOSE BLD-MCNC: 160 MG/DL (ref 70–99)
GLUCOSE BLD-MCNC: 213 MG/DL (ref 65–105)
HCT VFR BLD CALC: 25.6 % (ref 36.3–47.1)
HCT VFR BLD CALC: 26.2 % (ref 36.3–47.1)
HCT VFR BLD CALC: 30.4 % (ref 36.3–47.1)
HEMOGLOBIN: 7.8 G/DL (ref 11.9–15.1)
HEMOGLOBIN: 8 G/DL (ref 11.9–15.1)
HEMOGLOBIN: 9.2 G/DL (ref 11.9–15.1)
IMMATURE GRANULOCYTES: 1 %
LYMPHOCYTES # BLD: 8 % (ref 24–43)
MCH RBC QN AUTO: 27 PG (ref 25.2–33.5)
MCHC RBC AUTO-ENTMCNC: 30.5 G/DL (ref 28.4–34.8)
MCV RBC AUTO: 88.5 FL (ref 82.6–102.9)
MITOCHONDRIAL ANTIBODY: 8.3 UNITS (ref 0–24.9)
MONOCYTES # BLD: 6 % (ref 3–12)
MORPHOLOGY: ABNORMAL
MORPHOLOGY: ABNORMAL
NRBC AUTOMATED: 1.1 PER 100 WBC
PDW BLD-RTO: 16.8 % (ref 11.8–14.4)
PLATELET # BLD: 191 K/UL (ref 138–453)
PLATELET ESTIMATE: ABNORMAL
PMV BLD AUTO: 11.5 FL (ref 8.1–13.5)
POTASSIUM SERPL-SCNC: 4.2 MMOL/L (ref 3.7–5.3)
PROCALCITONIN: 0.14 NG/ML
RBC # BLD: 2.96 M/UL (ref 3.95–5.11)
RBC # BLD: ABNORMAL 10*6/UL
SEG NEUTROPHILS: 85 % (ref 36–65)
SEGMENTED NEUTROPHILS ABSOLUTE COUNT: 6.97 K/UL (ref 1.5–8.1)
SMOOTH MUSCLE ANTIBODY: 17 UNITS (ref 0–19)
SODIUM BLD-SCNC: 142 MMOL/L (ref 135–144)
TOTAL PROTEIN: 6.5 G/DL (ref 6.4–8.3)
WBC # BLD: 8.2 K/UL (ref 3.5–11.3)
WBC # BLD: ABNORMAL 10*3/UL

## 2020-12-31 PROCEDURE — 85025 COMPLETE CBC W/AUTO DIFF WBC: CPT

## 2020-12-31 PROCEDURE — 84145 PROCALCITONIN (PCT): CPT

## 2020-12-31 PROCEDURE — 82947 ASSAY GLUCOSE BLOOD QUANT: CPT

## 2020-12-31 PROCEDURE — 6370000000 HC RX 637 (ALT 250 FOR IP): Performed by: NURSE PRACTITIONER

## 2020-12-31 PROCEDURE — 99239 HOSP IP/OBS DSCHRG MGMT >30: CPT | Performed by: INTERNAL MEDICINE

## 2020-12-31 PROCEDURE — 2700000000 HC OXYGEN THERAPY PER DAY

## 2020-12-31 PROCEDURE — 94640 AIRWAY INHALATION TREATMENT: CPT

## 2020-12-31 PROCEDURE — 94761 N-INVAS EAR/PLS OXIMETRY MLT: CPT

## 2020-12-31 PROCEDURE — 80053 COMPREHEN METABOLIC PANEL: CPT

## 2020-12-31 PROCEDURE — APPSS30 APP SPLIT SHARED TIME 16-30 MINUTES: Performed by: NURSE PRACTITIONER

## 2020-12-31 PROCEDURE — 85018 HEMOGLOBIN: CPT

## 2020-12-31 PROCEDURE — 6370000000 HC RX 637 (ALT 250 FOR IP): Performed by: INTERNAL MEDICINE

## 2020-12-31 PROCEDURE — 36415 COLL VENOUS BLD VENIPUNCTURE: CPT

## 2020-12-31 PROCEDURE — 85014 HEMATOCRIT: CPT

## 2020-12-31 PROCEDURE — 85379 FIBRIN DEGRADATION QUANT: CPT

## 2020-12-31 PROCEDURE — 6360000002 HC RX W HCPCS: Performed by: NURSE PRACTITIONER

## 2020-12-31 PROCEDURE — 99232 SBSQ HOSP IP/OBS MODERATE 35: CPT | Performed by: INTERNAL MEDICINE

## 2020-12-31 PROCEDURE — 85384 FIBRINOGEN ACTIVITY: CPT

## 2020-12-31 RX ORDER — DEXAMETHASONE 6 MG/1
6 TABLET ORAL DAILY
Qty: 7 TABLET | Refills: 0 | Status: SHIPPED | OUTPATIENT
Start: 2020-12-31 | End: 2021-01-07

## 2020-12-31 RX ORDER — ATORVASTATIN CALCIUM 10 MG/1
10 TABLET, FILM COATED ORAL NIGHTLY
Qty: 30 TABLET | Refills: 3 | Status: SHIPPED | OUTPATIENT
Start: 2020-12-31

## 2020-12-31 RX ADMIN — SUCRALFATE 1 G: 1 TABLET ORAL at 12:23

## 2020-12-31 RX ADMIN — ALBUTEROL SULFATE 2 PUFF: 90 AEROSOL, METERED RESPIRATORY (INHALATION) at 08:30

## 2020-12-31 RX ADMIN — DEXAMETHASONE 6 MG: 4 TABLET ORAL at 10:19

## 2020-12-31 RX ADMIN — AMLODIPINE BESYLATE 10 MG: 5 TABLET ORAL at 10:19

## 2020-12-31 RX ADMIN — FERROUS SULFATE TAB EC 325 MG (65 MG FE EQUIVALENT) 325 MG: 325 (65 FE) TABLET DELAYED RESPONSE at 10:19

## 2020-12-31 RX ADMIN — CARVEDILOL 3.12 MG: 3.12 TABLET, FILM COATED ORAL at 10:19

## 2020-12-31 RX ADMIN — FLUTICASONE PROPIONATE 1 SPRAY: 50 SPRAY, METERED NASAL at 10:20

## 2020-12-31 RX ADMIN — ISOSORBIDE DINITRATE 10 MG: 10 TABLET ORAL at 10:18

## 2020-12-31 RX ADMIN — IPRATROPIUM BROMIDE 2 PUFF: 17 AEROSOL, METERED RESPIRATORY (INHALATION) at 08:30

## 2020-12-31 RX ADMIN — MAGNESIUM GLUCONATE 500 MG ORAL TABLET 400 MG: 500 TABLET ORAL at 13:37

## 2020-12-31 RX ADMIN — ISOSORBIDE DINITRATE 10 MG: 10 TABLET ORAL at 13:37

## 2020-12-31 RX ADMIN — FOLIC ACID 1 MG: 1 TABLET ORAL at 10:19

## 2020-12-31 RX ADMIN — INSULIN LISPRO 2 UNITS: 100 INJECTION, SOLUTION INTRAVENOUS; SUBCUTANEOUS at 12:23

## 2020-12-31 RX ADMIN — FUROSEMIDE 40 MG: 40 TABLET ORAL at 10:19

## 2020-12-31 RX ADMIN — MAGNESIUM GLUCONATE 500 MG ORAL TABLET 400 MG: 500 TABLET ORAL at 10:18

## 2020-12-31 RX ADMIN — SUCRALFATE 1 G: 1 TABLET ORAL at 05:19

## 2020-12-31 ASSESSMENT — PAIN SCALES - WONG BAKER: WONGBAKER_NUMERICALRESPONSE: 0

## 2020-12-31 NOTE — PROGRESS NOTES
GI Progress notes    12/31/2020   8:56 AM    Name:  Wolfgang Cee  MRN:    [id number]     Acct:     [de-identified]   Room:  [address] [place] [address] Day: 3     Admit Date: 12/28/2020 12:13 PM  PCP: Arielle Mejia MD    Subjective:     C/C:   Chief Complaint   Patient presents with    Abdominal Pain     pt has been exposed to covid       Interval History: Status: improved. Patient currently in isolation due to Mat[facility] [place] [facility]\"". Discussed with RN. No abdominal pain, nausea, vomiting. Hypertensive  Afebrile  Liver disease workup unremarkable. LFTs downtrending  Iron panel shows iron deficiency      Medications: Allergies:    Allergies   Allergen Reactions    Codeine     Lisinopril Other (See Comments)     Angioedema    Percocet [Oxycodone-Acetaminophen]        Current Meds:     potassium chloride (KLOR-CON M) extended release tablet 40 mEq, PRN    Or      potassium bicarb-citric acid (EFFER-K) effervescent tablet 40 mEq, PRN    Or      potassium chloride 10 mEq/100 mL IVPB (Peripheral Line), PRN      magnesium oxide (MAG-OX) tablet 400 mg, TID      ferrous sulfate (FE TABS 325) EC tablet 325 mg, Daily with breakfast      atorvastatin (LIPITOR) tablet 10 mg, Nightly      folic acid (FOLVITE) tablet 1 mg, Daily      isosorbide dinitrate (ISORDIL) tablet 10 mg, TID      acetaminophen (TYLENOL) tablet 650 mg, Q6H PRN    Or      acetaminophen (TYLENOL) suppository 650 mg, Q6H PRN      dexamethasone (DECADRON) tablet 6 mg, Daily      amLODIPine (NORVASC) tablet 10 mg, Daily      fluticasone (FLONASE) 50 MCG/ACT nasal spray 1 spray, BID      insulin lispro (HUMALOG) injection vial 0-6 Units, TID WC      insulin lispro (HUMALOG) injection vial 0-3 Units, Nightly      glucose (GLUTOSE) 40 % oral gel 15 g, PRN      dextrose 50 % IV solution, PRN      glucagon (rDNA) injection 1 mg, PRN      dextrose 5 % solution, PRN      pantoprazole (PROTONIX) injection 40 mg, BID   albuterol sulfate  (90 Base) MCG/ACT inhaler 2 puff, Q6H PRN      ipratropium (ATROVENT HFA) 17 MCG/ACT inhaler 2 puff, 4x Daily      furosemide (LASIX) tablet 40 mg, Daily      carvedilol (COREG) tablet 3.125 mg, BID WC      sucralfate (CARAFATE) tablet 1 g, 4 times per day        Data:     Code Status:  Prior    Family History   Problem Relation Age of Onset    Diabetes Mother     Diabetes Father        Social History     Socioeconomic History    Marital status: Single     Spouse name: Not on file    Number of children: Not on file    Years of education: Not on file    Highest education level: Not on file   Occupational History    Not on file   Social Needs    Financial resource strain: Not on file    Food insecurity     Worry: Not on file     Inability: Not on file    Transportation needs     Medical: Not on file     Non-medical: Not on file   Tobacco Use    Smoking status: Former Smoker    Smokeless tobacco: Never Used   Substance and Sexual Activity    Alcohol use: No    Drug use: No    Sexual activity: Not on file   Lifestyle    Physical activity     Days per week: Not on file     Minutes per session: Not on file    Stress: Not on file   Relationships    Social connections     Talks on phone: Not on file     Gets together: Not on file     Attends Buddhism service: Not on file     Active member of club or organization: Not on file     Attends meetings of clubs or organizations: Not on file     Relationship status: Not on file    Intimate partner violence     Fear of current or ex partner: Not on file     Emotionally abused: Not on file     Physically abused: Not on file     Forced sexual activity: Not on file   Other Topics Concern    Not on file   Social History Narrative    Not on file       Vitals:  BP (!) 173/59   Pulse 62   Temp 97.5 °F (36.4 °C) (Oral)   Resp 18   Ht 5' 6\" (1.676 m)   Wt 190 lb (86.2 kg)   SpO2 96%   BMI 30.67 kg/m² Temp (24hrs), Av °F (36.7 °C), Min:97.5 °F (36.4 °C), Max:98.7 °F (37.1 °C)    Recent Labs     20  1110 20  1646 20  1910 20  0749   POCGLU 240* 195* 203* 131*       I/O (24Hr):     Intake/Output Summary (Last 24 hours) at 2020 0856  Last data filed at 2020 1319  Gross per 24 hour   Intake 360 ml   Output    Net 360 ml       Labs:      CBC:   Lab Results   Component Value Date    WBC 8.2 2020    RBC 2.96 2020    RBC 4.28 2012    HGB 8.0 2020    HCT 26.2 2020    MCV 88.5 2020    MCH 27.0 2020    MCHC 30.5 2020    RDW 16.8 2020     2020     2012    MPV 11.5 2020     CBC with Differential:    Lab Results   Component Value Date    WBC 8.2 2020    RBC 2.96 2020    RBC 4.28 2012    HGB 8.0 2020    HCT 26.2 2020     2020     2012    MCV 88.5 2020    MCH 27.0 2020    MCHC 30.5 2020    RDW 16.8 2020    NRBC 2 2020    LYMPHOPCT 8 2020    MONOPCT 6 2020    BASOPCT 0 2020    MONOSABS 0.49 2020    LYMPHSABS 0.66 2020    EOSABS 0.00 2020    BASOSABS 0.00 2020    DIFFTYPE NOT REPORTED 2020     Hemoglobin/Hematocrit:    Lab Results   Component Value Date    HGB 8.0 2020    HCT 26.2 2020     CMP:    Lab Results   Component Value Date     2020    K 4.2 2020     2020    CO2 24 2020    BUN 21 2020    CREATININE 0.89 2020    GFRAA >60 2020    LABGLOM >60 2020    GLUCOSE 160 2020    GLUCOSE 180 2012    PROT 6.5 2020    LABALBU 2.8 2020    LABALBU 4.3 2012    CALCIUM 8.7 2020    BILITOT 0.38 2020    ALKPHOS 90 2020    AST 52 2020    ALT 99 2020     BMP:    Lab Results   Component Value Date     2020    K 4.2 2020     2020 CO2 24 12/31/2020    BUN 21 12/31/2020    LABALBU 2.8 12/31/2020    LABALBU 4.3 04/23/2012    CREATININE 0.89 12/31/2020    CALCIUM 8.7 12/31/2020    GFRAA >60 12/31/2020    LABGLOM >60 12/31/2020    GLUCOSE 160 12/31/2020    GLUCOSE 180 04/30/2012     PT/INR:    Lab Results   Component Value Date    PROTIME 10.3 04/09/2016    PROTIME 10.6 03/13/2012    INR 1.0 04/09/2016     PTT:    Lab Results   Component Value Date    APTT 23.8 04/09/2016   [APTT}    Physical Examination:        Due to the current efforts to prevent transmission of COVID-19 and also the need to preserve PPE for other caregivers, a face-to-face encounter with the patient was not performed. That being said, all relevant records and diagnostic tests were reviewed, including laboratory results and imaging. Please reference any relevant documentation elsewhere.  Care will be coordinated with the primary service      Assessment:        Primary Problem  Anemia     Active Hospital Problems    Diagnosis Date Noted    COVID-19 [U07.1] 12/28/2020    Alzheimer's disease ([facility] [G30.9, F02.80] 05/06/2020    Cardiac pacemaker in situ [Z95.0] 05/06/2020    History of cardiac arrest [Z86.74] 05/06/2020    Long term current use of anticoagulant therapy [Z79.01] 05/06/2020    Anemia [D64.9] 09/07/2019    Asthma [J45.909] 01/09/2018    Benign hypertension [I10] 01/09/2018    Hypercholesterolemia [E78.00] 01/09/2018    COPD (chronic obstructive pulmonary disease) ([facility] [J44.9] 08/09/2017    Ventricular tachycardia, polymorphic ([facility] [I47.2] 11/25/2016    Presence of automatic implantable cardioverter-defibrillator [Z95.810] 03/28/2016    Acute kidney injury ([facility] [N17.9] 02/14/2016    Coronary artery disease involving native coronary artery of native heart without angina pectoris [I25.10] 02/10/2016    Type 2 diabetes mellitus with hyperglycemia ([facility] [E11.65] 02/07/2016    Chronic diastolic heart failure ([facility] [I50.32] 11/19/2013

## 2020-12-31 NOTE — PROGRESS NOTES
Progress Note    Patient Name:  Reg Hutchinson    :  2020 11:51 AM      SUBJECTIVE       Ms. Seble Kent was not physically assessed due to COVID-19 restrictions. Permission obtained through medical records and primary nursing. No issues noted overnight. Reviewed telemetry patient noted to be in sinus rhythm with intermittent atrial pacing. No further evidence of significant bleeding. Plans for discharge later this afternoon. OBJECTIVE     Vital signs:    BP (!) 149/61   Pulse 66   Temp 97.7 °F (36.5 °C) (Oral)   Resp 18   Ht 5' 6\" (1.676 m)   Wt 190 lb (86.2 kg)   SpO2 98%   BMI 30.67 kg/m²  2 L/min      Admit Weight:  190 lb (86.2 kg)    Last 3 weights: Wt Readings from Last 3 Encounters:   20 190 lb (86.2 kg)   18 218 lb 9.6 oz (99.2 kg)   16 230 lb (104.3 kg)       BMI: Body mass index is 30.67 kg/m². Input/Output:       Intake/Output Summary (Last 24 hours) at 2020 1151  Last data filed at 2020 0830  Gross per 24 hour   Intake 720 ml   Output    Net 720 ml         Exam:     General appearance: awake and alert moves all ext   Lungs: no rhonchi, no wheezes, no rales  Heart: S1 and S2 no murmur  Abdomen: positive bowel sounds, no bruits, no masses  Extremities: warm and dry, no cyanosis, no clubbing        Laboratory Studies:     CBC:   Recent Labs     20  0617 20  0617 20  0635 20  1915 20  0042 20  0538   WBC 4.5  --  7.3  --   --  8.2   HGB 7.9*   < > 7.8* 8.6* 7.8* 8.0*   HCT 25.8*   < > 25.2* 27.7* 25.6* 26.2*   MCV 89.3  --  88.1  --   --  88.5     --  184  --   --  191    < > = values in this interval not displayed. BMP:   Recent Labs     20  0617 20  0635 20  0538    143 142   K 3.9 3.4* 4.2    108* 111*   CO2 23 26 24   BUN 18 20 21   CREATININE 0.99* 0.98* 0.89     PT/INR: No results for input(s): PROTIME, INR in the last 72 hours. APTT: No results for input(s): APTT in the last 72 hours. MAG:   Recent Labs     20  0617 20  0635   MG 2.3 2.1     D Dimer:   Recent Labs     20  0617 20  0635 20  0538   DDIMER 8.60* 16.90* 17.89*     Troponin  No results for input(s): TROPONINI in the last 72 hours. No results for input(s): TROPONINT in the last 72 hours. BNP No results for input(s): BNP in the last 72 hours. Recent Labs     20  1230   PROBNP 12,509*         Pulse Ox:  SpO2  Av.3 %  Min: 95 %  Max: 98 %  Supplemental O2: O2 Flow Rate (L/min): 2 L/min     Current Meds:    magnesium oxide  400 mg Oral TID    ferrous sulfate  325 mg Oral Daily with breakfast    atorvastatin  10 mg Oral Nightly    folic acid  1 mg Oral Daily    isosorbide dinitrate  10 mg Oral TID    dexamethasone  6 mg Oral Daily    amLODIPine  10 mg Oral Daily    fluticasone  1 spray Nasal BID    insulin lispro  0-6 Units Subcutaneous TID WC    insulin lispro  0-3 Units Subcutaneous Nightly    pantoprazole  40 mg Intravenous BID    ipratropium  2 puff Inhalation 4x Daily    furosemide  40 mg Oral Daily    carvedilol  3.125 mg Oral BID WC    sucralfate  1 g Oral 4 times per day     Continuous Infusions:    dextrose              ASSESSMENT     Principal Problem:    Anemia  Active Problems:    Type 2 diabetes mellitus with hyperglycemia (HCC)    Coronary artery disease involving native coronary artery of native heart without angina pectoris    COVID-19    Alzheimer's disease (HCC)    Asthma    Benign hypertension    Cardiac pacemaker in situ    Chronic diastolic heart failure (HCC)    COPD (chronic obstructive pulmonary disease) (HCC)    History of cardiac arrest    Hypercholesterolemia    Long term current use of anticoagulant therapy    Presence of automatic implantable cardioverter-defibrillator    Ventricular tachycardia, polymorphic ([facility])  Resolved Problems:    Acute kidney injury ([address]      PLAN

## 2020-12-31 NOTE — PROGRESS NOTES
Home Oxygen Evaluation    Home Oxygen Evaluation completed. Patient is on 2 liters per minute via nasal cannula.     Resting SpO2 on room air = 96%    SpO2 on room air with exercise = 86%  SpO2 on oxygen as above with exercise = 94%    Testing completed at [place] [employer] [facility]  10:44 AM

## 2020-12-31 NOTE — PROGRESS NOTES
Physician Progress Note      PATIENT:               Cecilia Gowers  [facility] #:                  [id number]  :                       1945  ADMIT DATE:       2020 12:13 PM  DISCH DATE:  RESPONDING  PROVIDER #:        Jodi Blake MD          QUERY TEXT:    Patient admitted with iron deficiency anemia and Covid-19 pneumonia. Noted   documentation of acute on chronic mild systolic and diastolic CHF in 98/ H &   P and progress notes. If possible, please document in progress notes and   discharge summary:    The medical record reflects the following:  Risk Factors: HTN, LAN on CKD, anemia  Clinical Indicators:  H & P exam notes:  fine crackles in lungs, trace   pedal edema; pt denied cough or SOB;   Pro-BNP 12,509;   CT A/P:     Patchy ground-glass opacities in the lung bases compatible with atypical/viral   pneumonia, and consistent with history of COVID-19 pneumonia. 2. Small   bilateral pleural effusions and diffuse body wall edema. ? Findings  compatible with CHF;  echo:  EF 65% with grade II diastolic dysfunction,   moderate TR and pulmonary HTN  Treatment: diagnostic testing (ECHO), continued 40mg oral Lasix as at home  Options provided:  -- Acute on chronic systolic and diastolic CHF present as evidenced by, Please   document evidence. -- Acute on chronic systolic and diastolic CHF was ruled out and chronic HFpEF   confirmed  -- Other - I will add my own diagnosis  -- Disagree - Not applicable / Not valid  -- Disagree - Clinically unable to determine / Unknown  -- Refer to Clinical Documentation Reviewer    PROVIDER RESPONSE TEXT:    Patient was noted to have mild left-sided diverticulosis.  1.2 cmAcute on   chronic diastolic CHF confirmed by echocardiogram    Query created by: Xi Kelly on 2020 11:35 AM      Electronically signed by:  Jodi Blake MD 2020 11:39 AM

## 2020-12-31 NOTE — PROGRESS NOTES
Discharge paperwork completed with patient at bedside. Arrangements made for ambulette transportation as patient's original ride was not going to arrive until late. Patient belongings packed and in patient's possession. New medications reviewed.

## 2020-12-31 NOTE — CARE COORDINATION
DC planning    Spoke with pt, over the phone she declines any need for vns, her 32 y.o. grandson lives with her and her  who is an amputee and helps with anything they may need. Discussed need for home 02-she is an agreement she prefers to be home when 02 is delivered Verena Sarah is gonna be a mess and I want to figure out where it will fit best\".      Faxed DME order, face to face, facesheet, testing to [facility].

## 2020-12-31 NOTE — DISCHARGE SUMMARY
[facility]  Office: [address], DO, Westonbrenda Abrams, DO, Rayo Hewitt, DO, [facility], DO, Petros Diaz MD, Emilie Eubanks MD, Bi Diaz MD, Souleymane Mao MD, Hemal Frank MD, Andrea Campos MD, Pina Adorno MD, Patricio Bumpers, MD, Arpit Julien MD, Natalia Cleaning DO, Anabela Leonard MD, Fartun Castro MD, Chapin Morales DO, Marleny Sal MD,  Jeffy Stuarttering, DO, Nas Nelson MD, Melissa Andrade MD, Faye Doe, [facility], [facility], CNP, Alejandro Soler, CNP, Donavan Pitts, CNS, Joan Gonzalze, CNP, Kaylan Tyler, CNP, Yony Caruso, CNP, Isela Tobin, CNP, Joy Rodriguez, CNP, Norah Turpin PA-C, Marcello Kayser, [facility], Mihai Love CNP, Pravin, CNP, Willi Marx, [facility], Perez Crawley, CNP, [facility]    Discharge Summary     Patient ID: Wolfgang Cee  :  1945   MRN: [id number]     ACCOUNT:  [de-identified]   Patient's PCP: Arielle Mejia MD  Admit Date: 2020   Discharge Date: 2020     Length of Stay: 3  Code Status:  Prior  Admitting Physician: Melissa Andrade MD  Discharge Physician: Melissa Andrade MD     Active Discharge Diagnoses:     Hospital Problem Lists:  Principal Problem:    Anemia  Active Problems:    Type 2 diabetes mellitus with hyperglycemia ([facility]    Coronary artery disease involving native coronary artery of native heart without angina pectoris    COVID-19    Alzheimer's disease (HCC)    Asthma    Benign hypertension    Cardiac pacemaker in situ    Chronic diastolic heart failure (HCC)    COPD (chronic obstructive pulmonary disease) ([facility]    History of cardiac arrest    Hypercholesterolemia    Long term current use of anticoagulant therapy    Presence of automatic implantable cardioverter-defibrillator    Ventricular tachycardia, polymorphic ([facility]  Resolved Problems:    Acute kidney injury [facility])      Admission Condition:  fair Discharged Condition: good    Hospital Stay:     Hospital Course: This is a 68-year-old female with past medical history of congestive heart failure, type 2 diabetes, asthma, COPD, polymorphic V. fib arrest, PAF status post AICD placement, GERD, CKD stage III who came to emergency department with complaints of abdominal pain. Patient was recently exposed to COVID-19 and was found to be positive upon admission as well. She was noted to have hemoglobin of 6 upon admission. Patient does follow-up with paramedical GI group. Patient was also noted to have acute on chronic kidney injury. Throughout the course of her hospital stay patient received PRBC for low hemoglobin. Her hemoglobin has been stable. She was seen by GI. No active bleeding was seen. Patient initially had abdominal pain that has resolved. She was noted to have elevated transaminase which seems to be resolving as well. Her AFP was normal. Plan is for patient to continue PPI and Carafate. To follow-up with GI as an outpatient. Patient was also noted to be positive for COVID-19 infection. She is being treated with Decadron. She was not a candidate for remdesivir due to kidney injury. Patient was seen by nephrology and cardiology as well and they've both cleared her for discharge. Patient will follow-up with cardiology as her Xarelto has been discontinued. She'll be evaluated for watchman device. Patient will need to complete a total of 10-day of quarantine.  Be discharged home on Decadron for 7 more days.         Significant therapeutic interventions: PRBC    Significant Diagnostic Studies:   Labs / Micro:  CBC:   Lab Results   Component Value Date    WBC 8.2 12/31/2020    RBC 2.96 12/31/2020    RBC 4.28 04/30/2012    HGB 8.0 12/31/2020    HCT 26.2 12/31/2020    MCV 88.5 12/31/2020    MCH 27.0 12/31/2020    MCHC 30.5 12/31/2020    RDW 16.8 12/31/2020     12/31/2020     04/30/2012     BMP:    Lab Results   Component Value Date GLUCOSE 160 12/31/2020    GLUCOSE 180 04/30/2012     12/31/2020    K 4.2 12/31/2020     12/31/2020    CO2 24 12/31/2020    ANIONGAP 7 12/31/2020    BUN 21 12/31/2020    CREATININE 0.89 12/31/2020    BUNCRER 24 12/31/2020    CALCIUM 8.7 12/31/2020    LABGLOM >60 12/31/2020    GFRAA >60 12/31/2020    GFR      12/31/2020    GFR NOT REPORTED 12/31/2020     HFP:    Lab Results   Component Value Date    PROT 6.5 12/31/2020     CMP:    Lab Results   Component Value Date    GLUCOSE 160 12/31/2020    GLUCOSE 180 04/30/2012     12/31/2020    K 4.2 12/31/2020     12/31/2020    CO2 24 12/31/2020    BUN 21 12/31/2020    CREATININE 0.89 12/31/2020    ANIONGAP 7 12/31/2020    ALKPHOS 90 12/31/2020    ALT 99 12/31/2020    AST 52 12/31/2020    BILITOT 0.38 12/31/2020    LABALBU 2.8 12/31/2020    LABALBU 4.3 04/23/2012    ALBUMIN NOT REPORTED 12/31/2020    LABGLOM >60 12/31/2020    GFRAA >60 12/31/2020    GFR      12/31/2020    GFR NOT REPORTED 12/31/2020    PROT 6.5 12/31/2020    CALCIUM 8.7 12/31/2020     PT/INR:    Lab Results   Component Value Date    PROTIME 10.3 04/09/2016    PROTIME 10.6 03/13/2012    INR 1.0 04/09/2016     PTT:   Lab Results   Component Value Date    APTT 23.8 04/09/2016     FLP:    Lab Results   Component Value Date    CHOL 156 12/29/2020    TRIG 104 12/29/2020    HDL 46 12/29/2020     U/A:    Lab Results   Component Value Date    COLORU YELLOW 12/29/2020    TURBIDITY CLEAR 12/29/2020    SPECGRAV 1.020 12/29/2020    HGBUR NEGATIVE 12/29/2020    PHUR 6.0 12/29/2020    PROTEINU NEGATIVE 12/29/2020    GLUCOSEU NEGATIVE 12/29/2020    GLUCOSEU NEGATIVE 04/22/2012    KETUA NEGATIVE 12/29/2020    BILIRUBINUR NEGATIVE 12/29/2020    BILIRUBINUR NEGATIVE 04/22/2012    UROBILINOGEN Normal 12/29/2020    NITRU NEGATIVE 12/29/2020    LEUKOCYTESUR NEGATIVE 12/29/2020     TSH:    Lab Results   Component Value Date    TSH 0.69 05/27/2020        Radiology: Ct Abdomen Pelvis Wo Contrast Additional Contrast? Oral    Result Date: 12/29/2020  1. Patchy ground-glass opacities in the lung bases compatible with atypical/viral pneumonia, and consistent with history of COVID-19 pneumonia. 2. Small bilateral pleural effusions and diffuse body wall edema. Findings compatible with CHF. 3. Retroperitoneal induration about the pancreas raising the possibility for acute pancreatitis. 4. Normal appendix. 5. Stool throughout the colon in keeping with constipation. Nm Lung Vent/perfusion (vq)    Result Date: 12/28/2020  Low Probability for Pulmonary Embolus. Us Renal Complete    Result Date: 12/29/2020  Negative examination as discussed. Xr Chest Portable    Result Date: 12/28/2020  Increased interstitial changes throughout the lungs, possibly infiltrate or edema. Mild cardiomegaly. Consultations:    Consults:     Final Specialist Recommendations/Findings:   IP CONSULT TO INTERNAL MEDICINE  IP CONSULT TO PHARMACY  IP CONSULT TO CARDIOLOGY  IP CONSULT TO GI  IP CONSULT TO NEPHROLOGY  IP CONSULT TO HOME CARE NEEDS  IP CONSULT TO SPIRITUAL SERVICES      The patient was seen and examined on day of discharge and this discharge summary is in conjunction with any daily progress note from day of discharge. Discharge plan:     Disposition: Home with home health    Physician Follow Up:       Follow-up with PCP, cardiology, nephrology, GI within 1 week after discharge    Requiring Further Evaluation/Follow Up POST HOSPITALIZATION/Incidental Findings: Discontinue Xarelto    Diet: renal diet    Activity: As tolerated    Instructions to Patient: Be compliant with your medications, follow-ups and diet    Discharge Medications:      Medication List      START taking these medications    dexamethasone 6 MG tablet  Commonly known as: DECADRON  Take 1 tablet by mouth daily for 7 days        CONTINUE taking these medications    albuterol sulfate  (90 Base) MCG/ACT inhaler amLODIPine 10 MG tablet  Commonly known as: NORVASC     atorvastatin 10 MG tablet  Commonly known as: LIPITOR  Take 1 tablet by mouth nightly     Breo Ellipta 200-25 MCG/INH Aepb inhaler  Generic drug: Fluticasone furoate-vilanterol     Bumex 2 MG tablet  Generic drug: bumetanide     carvedilol 25 MG tablet  Commonly known as: COREG     donepezil 10 MG tablet  Commonly known as: ARICEPT     ferrous sulfate 325 (65 Fe) MG tablet  Commonly known as: IRON 325     fluticasone 50 MCG/ACT nasal spray  Commonly known as: FLONASE     folic acid 1 MG tablet  Commonly known as: FOLVITE     hydrALAZINE 50 MG tablet  Commonly known as: APRESOLINE     isosorbide dinitrate 20 MG tablet  Commonly known as: ISORDIL     magnesium oxide 400 (240 Mg) MG tablet  Commonly known as: MAG-OX     memantine ER 14 MG Cp24 extended release capsule  Commonly known as: NAMENDA XR     metFORMIN 500 MG tablet  Commonly known as: GLUCOPHAGE  Take 1 tablet by mouth 2 times daily     mexiletine 150 MG capsule  Commonly known as: MEXITIL     mirtazapine 15 MG tablet  Commonly known as: REMERON     montelukast 10 MG tablet  Commonly known as: SINGULAIR     pantoprazole 40 MG tablet  Commonly known as: PROTONIX     potassium chloride 20 MEQ extended release tablet  Commonly known as: KLOR-CON M     probenecid 500 MG tablet  Commonly known as: BENEMID     vitamin D 1.25 MG (38494 UT) Caps capsule  Commonly known as: ERGOCALCIFEROL        STOP taking these medications    aspirin 81 MG tablet     cloNIDine 0.3 MG/24HR  Commonly known as: CATAPRES     colchicine 0.6 MG tablet  Commonly known as: Colcrys     fexofenadine 180 MG tablet  Commonly known as: ALLEGRA     furosemide 40 MG tablet  Commonly known as: LASIX     ipratropium 0.02 % nebulizer solution  Commonly known as: ATROVENT     PriLOSEC OTC 20 MG tablet  Generic drug: omeprazole     Xarelto 20 MG Tabs tablet  Generic drug: rivaroxaban           Where to Get Your Medications

## 2020-12-31 NOTE — PROGRESS NOTES
Patient was evaluated today for the diagnosis of Covid 19 pneumonia. I entered a DME order for home oxygen because the diagnosis and testing requires the patient to have supplemental oxygen. Condition will improve or be benefited by oxygen use. The patient is not able to perform good mobility in a home setting and therefore does require the use of a portable oxygen system. The need for this equipment was discussed with the patient and she understands and is in agreement.

## 2020-12-31 NOTE — CARE COORDINATION
DC Planning    Informed pt ride cannot come till 11:30 tonight. Her oxygen is on it'sway, called [facility] they do have availability and can take covid. Pt is anxious to get home and will need home 02 set up, her out of pocket cost is estimated about $60 for the trip per Anderson Bartolo from [place], they will run thru insurance with covid dx-she might not have a cost incurred but that will have to go thru billing first.     She is an agreement to cost \"I just want to go home, I'm ok with that\"    Spoke with Anderson Bartolo from [place] they will  pt via [facility] about 4 pm, informed pt \"I am dressed, thank you very much\"    Faxed medical necessity request form to [phone].

## 2021-01-02 ENCOUNTER — CARE COORDINATION (OUTPATIENT)
Dept: CASE MANAGEMENT | Age: 76
End: 2021-01-02

## 2021-01-02 NOTE — CARE COORDINATION
[facility] Transitions Initial Follow Up Call    Call within 2 business days of discharge: Yes    Patient: Quynh Miranda Patient : 1945   MRN: [id number]  Reason for Admission: Covid+/Covid-19 monitoring  Discharge Date: 20 RARS: Readmission Risk Score: 17      Last Discharge [facility]       Complaint Diagnosis Description Type Department Provider    20 Abdominal Pain COVID-19 . .. ED to Hosp-Admission (Discharged) (ADMITTED) [facility] Rueben Severin, MD; Jacqui Alegre. .. Spoke with: Sherwin Kimball: MSTA    Was able to contact [facility] for initial Covid1-9 monitoring. She stated that she was \"handing in there\". She stated that she is fatigued and has periods of confusion since she started to get sick. She has an occasional cough and abdominal pain. She denies fevers and she is wearing her oxygen. She said that they picked her her new medication, but needs to go through the discharge medication list with  to stop the discontinued medications. She will be calling PCP for follow up. Non-face-to-face services provided:  Scheduled appointment with PCP-pt to call Monday   Scheduled appointment with Specialist-pt to call GI  Obtained and reviewed discharge summary and/or continuity of care documents  Assessment and support for treatment adherence and medication management-reviewed       Challenges to be reviewed by the provider   Additional needs identified to be addressed with provider No  none    Discussed COVID-19 related testing which was available at this time. Test results were positive. Patient informed of results, if available? Yes         Method of communication with provider : none    Advance Care Planning:   Does patient have an Advance Directive:  reviewed and current. Was this a readmission?  No  Patient stated reason for admission: abdominal pain  Patients top risk factors for readmission: functional cognitive ability and medical condition    Care Transition Nurse (CTN) contacted the patient by telephone to perform post hospital discharge assessment. Verified name and  with patient as identifiers. Provided introduction to self, and explanation of the CTN role. CTN reviewed discharge instructions, medical action plan and red flags with patient who verbalized understanding. Patient given an opportunity to ask questions and does not have any further questions or concerns at this time. Were discharge instructions available to patient? Yes. Reviewed appropriate site of care based on symptoms and resources available to patient including: PCP and When to call 911. The patient agrees to contact the PCP office for questions related to their healthcare. New medication review was performed with patient, who verbalizes understanding of administration of home medications. Covid Risk Education    Patient has following risk factors of: heart failure and chronic kidney disease. Education provided regarding infection prevention, and signs and symptoms of COVID-19 and when to seek medical attention with patient who verbalized understanding. Discussed exposure protocols and quarantine From CDC: Are you at higher risk for severe illness?   and given an opportunity for questions and concerns. The patient agrees to contact the COVID-19 hotline 098-166-4593 or PCP office for questions related to COVID-19. For more information on steps you can take to protect yourself, see CDC's How to Protect Yourself     Patient/family/caregiver given information for [employer] [facility] and agrees to enroll no  Patient's preferred e-mail: declines  Patient's preferred phone number: declines    Discussed follow-up appointments. If no appointment was previously scheduled, appointment scheduling offered: No and office closed. Is follow up appointment scheduled within 7 days of discharge?  No  Non-[facility] follow up appointment(s):     Plan for follow-up call in 5-7 days based on severity of symptoms and risk factors. Plan for next call: symptom management-covid follow up  CTN provided contact information for future needs.           Care Transitions 24 Hour Call    Do you have any ongoing symptoms?: Yes  Patient-reported symptoms: Other (Comment: confusion)  Do you have a copy of your discharge instructions?: Yes  Do you have all of your prescriptions and are they filled?: Yes  Have you been contacted by a [address]?: No  Have you scheduled your follow up appointment?: No  Were you discharged with any Home Care or Post Acute Services: No  Do you feel like you have everything you need to keep you well at home?: Yes  Care Transitions Interventions         Follow Up  Future Appointments   Date Time Provider Jeff Lara   1/31/2021  9:00 AM SCHEDULE, [address] HO   2/4/2021 11:00 AM [address] [facility] 6, RN

## 2021-01-03 LAB
CULTURE: NORMAL
CULTURE: NORMAL
Lab: NORMAL
Lab: NORMAL
SPECIMEN DESCRIPTION: NORMAL
SPECIMEN DESCRIPTION: NORMAL

## 2021-01-05 LAB
DIRECT EXAM: NORMAL
Lab: NORMAL
SPECIMEN DESCRIPTION: NORMAL

## 2021-01-07 ENCOUNTER — CARE COORDINATION (OUTPATIENT)
Dept: CASE MANAGEMENT | Age: 76
End: 2021-01-07

## 2021-01-07 NOTE — CARE COORDINATION
[facility] Transitions Follow Up Call- 1st attempt COVID risk follow up call       2021    Patient: Maria Guadalupe Parra  Patient : 1945   MRN: [id number]  Reason for Admission: COVID-1, anemia, hypoxia  Discharge Date: 20 RARS: Readmission Risk Score: 17         Attempted to reach patient for subsequent transitional call.   VM left to return call to [phone]    Follow Up  Future Appointments   Date Time Provider Jeff Lara   2021  9:00 AM SCHEDULE, [address]    2021 11:00 AM [facility] Respiratory Tech [facility] , RN

## 2021-01-10 LAB — SMOOTH MUSCLE ANTIBODY: ABNORMAL

## 2021-01-14 ENCOUNTER — CARE COORDINATION (OUTPATIENT)
Dept: CASE MANAGEMENT | Age: 76
End: 2021-01-14

## 2021-01-14 NOTE — CARE COORDINATION
[facility] Trans[facility] Follow Up Call- 2nd attempt COVID risk follow up call       2021    Patient: Cassie Briseno  Patient : 1945   MRN: [id number]  Reason for Admission: COVID-1, anemia, hypoxia   Discharge Date: 20 RARS: Readmission Risk Score: 17         Attempted to reach patient for final transitional call. 2nd attempt. VM left to return call to [phone] or continue communication with providers.  Episode closed         Follow Up  Future Appointments   Date Time Provider Jeff Lara   2021  9:00 AM SCHEDULE, [facility] COVID SCREENING [facility]   2021 11:00 AM [facility] Respiratory Tech [facility]  [address], RN

## 2021-01-31 ENCOUNTER — HOSPITAL ENCOUNTER (OUTPATIENT)
Dept: LAB | Age: 76
Setting detail: SPECIMEN
Discharge: HOME | End: 2021-01-31

## 2021-01-31 DIAGNOSIS — Z01.818 PREOP TESTING: Primary | ICD-10-CM

## 2021-04-18 ENCOUNTER — HOSPITAL ENCOUNTER (OUTPATIENT)
Dept: LAB | Age: 76
Setting detail: SPECIMEN
Discharge: HOME | End: 2021-04-18
Payer: MEDICARE

## 2021-04-18 DIAGNOSIS — Z01.818 PREOP TESTING: Primary | ICD-10-CM

## 2021-04-18 PROCEDURE — U0003 INFECTIOUS AGENT DETECTION BY NUCLEIC ACID (DNA OR RNA); SEVERE ACUTE RESPIRATORY SYNDROME CORONAVIRUS 2 (SARS-COV-2) (CORONAVIRUS DISEASE [COVID-19]), AMPLIFIED PROBE TECHNIQUE, MAKING USE OF HIGH THROUGHPUT TECHNOLOGIES AS DESCRIBED BY CMS-2020-01-R: HCPCS

## 2021-04-18 PROCEDURE — U0005 INFEC AGEN DETEC AMPLI PROBE: HCPCS

## 2021-04-19 LAB
SARS-COV-2: NORMAL
SARS-COV-2: NOT DETECTED
SOURCE: NORMAL

## 2021-04-20 ENCOUNTER — TELEPHONE (OUTPATIENT)
Dept: PRIMARY CARE CLINIC | Age: 76
End: 2021-04-20

## 2023-05-14 ENCOUNTER — HOSPITAL ENCOUNTER (EMERGENCY)
Age: 78
Discharge: HOME | End: 2023-05-14
Attending: EMERGENCY MEDICINE
Payer: MEDICARE

## 2023-05-14 VITALS
BODY MASS INDEX: 30.53 KG/M2 | HEART RATE: 60 BPM | RESPIRATION RATE: 18 BRPM | WEIGHT: 190 LBS | HEIGHT: 66 IN | TEMPERATURE: 98.2 F | DIASTOLIC BLOOD PRESSURE: 46 MMHG | SYSTOLIC BLOOD PRESSURE: 141 MMHG | OXYGEN SATURATION: 100 %

## 2023-05-14 DIAGNOSIS — M79.89 SWELLING OF LEFT HAND: Primary | ICD-10-CM

## 2023-05-14 PROCEDURE — 99283 EMERGENCY DEPT VISIT LOW MDM: CPT

## 2023-05-14 PROCEDURE — 6370000000 HC RX 637 (ALT 250 FOR IP): Performed by: EMERGENCY MEDICINE

## 2023-05-14 RX ORDER — ACETAMINOPHEN 500 MG
1000 TABLET ORAL ONCE
Status: COMPLETED | OUTPATIENT
Start: 2023-05-14 | End: 2023-05-14

## 2023-05-14 RX ORDER — IBUPROFEN 800 MG/1
800 TABLET ORAL ONCE
Status: COMPLETED | OUTPATIENT
Start: 2023-05-14 | End: 2023-05-14

## 2023-05-14 RX ORDER — PREDNISONE 20 MG/1
50 TABLET ORAL ONCE
Status: COMPLETED | OUTPATIENT
Start: 2023-05-14 | End: 2023-05-14

## 2023-05-14 RX ORDER — PREDNISONE 50 MG/1
50 TABLET ORAL DAILY
Qty: 4 TABLET | Refills: 0 | Status: SHIPPED | OUTPATIENT
Start: 2023-05-14 | End: 2023-05-18

## 2023-05-14 RX ADMIN — PREDNISONE 50 MG: 20 TABLET ORAL at 15:51

## 2023-05-14 RX ADMIN — IBUPROFEN 800 MG: 800 TABLET, FILM COATED ORAL at 15:51

## 2023-05-14 RX ADMIN — ACETAMINOPHEN 1000 MG: 500 TABLET ORAL at 15:51

## 2023-05-14 ASSESSMENT — PAIN SCALES - GENERAL: PAINLEVEL_OUTOF10: 9

## 2023-05-14 ASSESSMENT — PAIN - FUNCTIONAL ASSESSMENT: PAIN_FUNCTIONAL_ASSESSMENT: 0-10

## 2023-05-15 ENCOUNTER — TELEPHONE (OUTPATIENT)
Dept: ORTHOPEDIC SURGERY | Age: 78
End: 2023-05-15

## 2023-05-16 ENCOUNTER — OFFICE VISIT (OUTPATIENT)
Dept: ORTHOPEDIC SURGERY | Age: 78
End: 2023-05-16

## 2023-05-16 VITALS — WEIGHT: 203 LBS | HEIGHT: 66 IN | BODY MASS INDEX: 32.62 KG/M2

## 2023-05-16 DIAGNOSIS — M19.031 ARTHRITIS OF WRIST, RIGHT: Primary | ICD-10-CM

## 2023-05-16 RX ORDER — METHYLPREDNISOLONE ACETATE 40 MG/ML
40 INJECTION, SUSPENSION INTRA-ARTICULAR; INTRALESIONAL; INTRAMUSCULAR; SOFT TISSUE ONCE
Status: COMPLETED | OUTPATIENT
Start: 2023-05-16 | End: 2023-05-16

## 2023-05-16 RX ORDER — LIDOCAINE HYDROCHLORIDE 10 MG/ML
5 INJECTION, SOLUTION INFILTRATION; PERINEURAL ONCE
Status: COMPLETED | OUTPATIENT
Start: 2023-05-16 | End: 2023-05-16

## 2023-05-16 RX ORDER — BUPIVACAINE HYDROCHLORIDE 2.5 MG/ML
2 INJECTION, SOLUTION INFILTRATION; PERINEURAL ONCE
Status: SHIPPED | OUTPATIENT
Start: 2023-05-16

## 2023-05-16 RX ADMIN — METHYLPREDNISOLONE ACETATE 40 MG: 40 INJECTION, SUSPENSION INTRA-ARTICULAR; INTRALESIONAL; INTRAMUSCULAR; SOFT TISSUE at 15:29

## 2023-05-16 RX ADMIN — LIDOCAINE HYDROCHLORIDE 5 ML: 10 INJECTION, SOLUTION INFILTRATION; PERINEURAL at 15:31

## 2023-05-16 NOTE — PROGRESS NOTES
This very pleasant 75-year-old lady is seen here complaining of pain in the left hand. She says this has been going on for about 2 to 3 weeks. She does not describe any history of injury. She had gone to the emergency room and referred here. 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 Examination: She has definite swelling and marked limitation of motion in the wrist.  All the motions were very painful. There is not particularly warm. Motions are limited. X-rays: 3 view show significant degenerative changes in the entire carpus along with radiocarpal joint involvement. Diagnosis: Posttraumatic osteoarthritis right wrist.  #2 history of bilateral knee replacement. #3 gout she did have a uric acid level of 8 but no other joints have been involved. Explained to her how the injection works and we will see her again as necessary. Treatment: Under sterile condition I injected 40 mg Depo-Medrol and 4 cc of 1% plain lidocaine into the wrist joint. She had immediate excellent response to this.

## 2023-07-07 ENCOUNTER — TELEPHONE (OUTPATIENT)
Dept: ORTHOPEDIC SURGERY | Age: 78
End: 2023-07-07

## 2023-07-07 NOTE — TELEPHONE ENCOUNTER
Patient family member michelle called in to scheduled appointment for patient with Dr. Tegan Hendrickson, offered appointment on 7/12/23 or 7/17/23 he stated that he would need to check with his nephew before scheduling and call office back to get scheduled.

## 2023-07-24 ENCOUNTER — OFFICE VISIT (OUTPATIENT)
Dept: ORTHOPEDIC SURGERY | Age: 78
End: 2023-07-24
Payer: MEDICARE

## 2023-07-24 VITALS — BODY MASS INDEX: 32.47 KG/M2 | HEIGHT: 66 IN | WEIGHT: 202 LBS

## 2023-07-24 DIAGNOSIS — M19.031 ARTHRITIS OF WRIST, RIGHT: Primary | ICD-10-CM

## 2023-07-24 PROCEDURE — 1123F ACP DISCUSS/DSCN MKR DOCD: CPT | Performed by: ORTHOPAEDIC SURGERY

## 2023-07-24 PROCEDURE — 99213 OFFICE O/P EST LOW 20 MIN: CPT | Performed by: ORTHOPAEDIC SURGERY

## 2023-08-21 ENCOUNTER — OFFICE VISIT (OUTPATIENT)
Dept: ORTHOPEDIC SURGERY | Age: 78
End: 2023-08-21
Payer: MEDICARE

## 2023-08-21 VITALS — HEIGHT: 66 IN | BODY MASS INDEX: 32.47 KG/M2 | WEIGHT: 202 LBS

## 2023-08-21 DIAGNOSIS — M19.031 ARTHRITIS OF WRIST, RIGHT: Primary | ICD-10-CM

## 2023-08-21 PROCEDURE — 1123F ACP DISCUSS/DSCN MKR DOCD: CPT | Performed by: ORTHOPAEDIC SURGERY

## 2023-08-21 PROCEDURE — 99212 OFFICE O/P EST SF 10 MIN: CPT | Performed by: ORTHOPAEDIC SURGERY

## 2023-08-21 NOTE — PROGRESS NOTES
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  However questioning her it does not appear that the pain is at the movement predominant. .  She in fact stated that the pain is better controlled. She uses a brace. And she has been using Voltaren cream which seems to have helped her. Patient also has history of dementia. Examination: She has limited flexion and extension of the wrist which are also painful movements. Other motions including rotation very pain-free and full. Diagnosis: Posttraumatic osteoarthritis right wrist.    Treatment: At the present time her symptoms are well controlled with application of Voltaren cream and the brace. About 3 months ago she had needed a corticosteroid injection which helped her only for about 3 weeks. I have given her further prescription for 100 g Juba of 1% diclofenac with 3 refills. I will see her as needed.

## 2025-07-30 ENCOUNTER — APPOINTMENT (OUTPATIENT)
Dept: GENERAL RADIOLOGY | Age: 80
End: 2025-07-30
Payer: MEDICARE

## 2025-07-30 ENCOUNTER — HOSPITAL ENCOUNTER (EMERGENCY)
Age: 80
Discharge: HOME | End: 2025-07-30
Payer: MEDICARE

## 2025-07-30 ENCOUNTER — APPOINTMENT (OUTPATIENT)
Dept: CT IMAGING | Age: 80
End: 2025-07-30
Payer: MEDICARE

## 2025-07-30 VITALS
RESPIRATION RATE: 16 BRPM | HEIGHT: 66 IN | WEIGHT: 200 LBS | TEMPERATURE: 97 F | SYSTOLIC BLOOD PRESSURE: 140 MMHG | BODY MASS INDEX: 32.14 KG/M2 | DIASTOLIC BLOOD PRESSURE: 60 MMHG | HEART RATE: 65 BPM | OXYGEN SATURATION: 95 %

## 2025-07-30 DIAGNOSIS — R14.2 BELCHING: ICD-10-CM

## 2025-07-30 DIAGNOSIS — R10.9 ABDOMINAL PAIN, UNSPECIFIED ABDOMINAL LOCATION: Primary | ICD-10-CM

## 2025-07-30 LAB
ALBUMIN SERPL-MCNC: 4.1 G/DL (ref 3.5–5.2)
ALBUMIN/GLOB SERPL: 1.4 {RATIO} (ref 1–2.5)
ALP SERPL-CCNC: 79 U/L (ref 35–104)
ALT SERPL-CCNC: 14 U/L (ref 10–35)
ANION GAP SERPL CALCULATED.3IONS-SCNC: 14 MMOL/L (ref 9–16)
AST SERPL-CCNC: 21 U/L (ref 10–35)
BACTERIA URNS QL MICRO: ABNORMAL
BASOPHILS # BLD: 0.04 K/UL (ref 0–0.2)
BASOPHILS NFR BLD: 1 % (ref 0–2)
BILIRUB DIRECT SERPL-MCNC: 0.1 MG/DL (ref 0–0.2)
BILIRUB INDIRECT SERPL-MCNC: 0.1 MG/DL
BILIRUB SERPL-MCNC: 0.2 MG/DL (ref 0–1.2)
BILIRUB UR QL STRIP: NEGATIVE
BUN SERPL-MCNC: 27 MG/DL (ref 8–23)
CALCIUM SERPL-MCNC: 9 MG/DL (ref 8.8–10.2)
CHLORIDE SERPL-SCNC: 105 MMOL/L (ref 98–107)
CLARITY UR: CLEAR
CO2 SERPL-SCNC: 23 MMOL/L (ref 20–31)
COLOR UR: YELLOW
CREAT SERPL-MCNC: 1.2 MG/DL (ref 0.5–0.9)
EOSINOPHIL # BLD: 0.35 K/UL (ref 0–0.44)
EOSINOPHILS RELATIVE PERCENT: 6 % (ref 1–4)
EPI CELLS #/AREA URNS HPF: ABNORMAL /HPF (ref 0–5)
ERYTHROCYTE [DISTWIDTH] IN BLOOD BY AUTOMATED COUNT: 17.1 % (ref 11.8–14.4)
GFR, ESTIMATED: 47 ML/MIN/1.73M2
GLUCOSE SERPL-MCNC: 95 MG/DL (ref 82–115)
GLUCOSE UR STRIP-MCNC: ABNORMAL MG/DL
HCT VFR BLD AUTO: 36.9 % (ref 36.3–47.1)
HGB BLD-MCNC: 12 G/DL (ref 11.9–15.1)
HGB UR QL STRIP.AUTO: NEGATIVE
IMM GRANULOCYTES # BLD AUTO: 0.02 K/UL (ref 0–0.3)
IMM GRANULOCYTES NFR BLD: 0 %
KETONES UR STRIP-MCNC: NEGATIVE MG/DL
LACTATE BLDV-SCNC: 2.1 MMOL/L (ref 0.5–1.9)
LEUKOCYTE ESTERASE UR QL STRIP: ABNORMAL
LIPASE SERPL-CCNC: 45 U/L (ref 13–60)
LYMPHOCYTES NFR BLD: 1.42 K/UL (ref 1.1–3.7)
LYMPHOCYTES RELATIVE PERCENT: 26 % (ref 24–43)
MAGNESIUM SERPL-MCNC: 2 MG/DL (ref 1.6–2.4)
MCH RBC QN AUTO: 28 PG (ref 25.2–33.5)
MCHC RBC AUTO-ENTMCNC: 32.5 G/DL (ref 28.4–34.8)
MCV RBC AUTO: 86 FL (ref 82.6–102.9)
MONOCYTES NFR BLD: 0.71 K/UL (ref 0.1–1.2)
MONOCYTES NFR BLD: 13 % (ref 3–12)
NEUTROPHILS NFR BLD: 54 % (ref 36–65)
NEUTS SEG NFR BLD: 3.01 K/UL (ref 1.5–8.1)
NITRITE UR QL STRIP: NEGATIVE
NRBC BLD-RTO: 0 PER 100 WBC
PH UR STRIP: 6.5 [PH] (ref 5–8)
PLATELET # BLD AUTO: 166 K/UL (ref 138–453)
PMV BLD AUTO: 11.4 FL (ref 8.1–13.5)
POTASSIUM SERPL-SCNC: 3.5 MMOL/L (ref 3.7–5.3)
PROT SERPL-MCNC: 7 G/DL (ref 6.6–8.7)
PROT UR STRIP-MCNC: NEGATIVE MG/DL
RBC # BLD AUTO: 4.29 M/UL (ref 3.95–5.11)
RBC # BLD: ABNORMAL 10*6/UL
RBC #/AREA URNS HPF: ABNORMAL /HPF (ref 0–2)
SODIUM SERPL-SCNC: 142 MMOL/L (ref 136–145)
SP GR UR STRIP: 1.01 (ref 1–1.03)
TROPONIN I SERPL HS-MCNC: 15 NG/L (ref 0–14)
UROBILINOGEN UR STRIP-ACNC: NORMAL EU/DL (ref 0–1)
WBC #/AREA URNS HPF: ABNORMAL /HPF (ref 0–5)
WBC OTHER # BLD: 5.6 K/UL (ref 3.5–11.3)

## 2025-07-30 PROCEDURE — 6360000004 HC RX CONTRAST MEDICATION

## 2025-07-30 PROCEDURE — 2500000003 HC RX 250 WO HCPCS

## 2025-07-30 PROCEDURE — 74177 CT ABD & PELVIS W/CONTRAST: CPT

## 2025-07-30 PROCEDURE — 85025 COMPLETE CBC W/AUTO DIFF WBC: CPT

## 2025-07-30 PROCEDURE — 80076 HEPATIC FUNCTION PANEL: CPT

## 2025-07-30 PROCEDURE — 2580000003 HC RX 258

## 2025-07-30 PROCEDURE — 71045 X-RAY EXAM CHEST 1 VIEW: CPT

## 2025-07-30 PROCEDURE — 99285 EMERGENCY DEPT VISIT HI MDM: CPT

## 2025-07-30 PROCEDURE — 80048 BASIC METABOLIC PNL TOTAL CA: CPT

## 2025-07-30 PROCEDURE — 81001 URINALYSIS AUTO W/SCOPE: CPT

## 2025-07-30 PROCEDURE — 84484 ASSAY OF TROPONIN QUANT: CPT

## 2025-07-30 PROCEDURE — 83735 ASSAY OF MAGNESIUM: CPT

## 2025-07-30 PROCEDURE — 83690 ASSAY OF LIPASE: CPT

## 2025-07-30 PROCEDURE — 83605 ASSAY OF LACTIC ACID: CPT

## 2025-07-30 PROCEDURE — 6370000000 HC RX 637 (ALT 250 FOR IP)

## 2025-07-30 RX ORDER — IOPAMIDOL 755 MG/ML
75 INJECTION, SOLUTION INTRAVASCULAR
Status: COMPLETED | OUTPATIENT
Start: 2025-07-30 | End: 2025-07-30

## 2025-07-30 RX ORDER — 0.9 % SODIUM CHLORIDE 0.9 %
80 INTRAVENOUS SOLUTION INTRAVENOUS ONCE
Status: COMPLETED | OUTPATIENT
Start: 2025-07-30 | End: 2025-07-30

## 2025-07-30 RX ORDER — 0.9 % SODIUM CHLORIDE 0.9 %
1000 INTRAVENOUS SOLUTION INTRAVENOUS ONCE
Status: COMPLETED | OUTPATIENT
Start: 2025-07-30 | End: 2025-07-30

## 2025-07-30 RX ORDER — DICYCLOMINE HYDROCHLORIDE 10 MG/1
10 CAPSULE ORAL ONCE
Status: COMPLETED | OUTPATIENT
Start: 2025-07-30 | End: 2025-07-30

## 2025-07-30 RX ORDER — SODIUM CHLORIDE 0.9 % (FLUSH) 0.9 %
10 SYRINGE (ML) INJECTION ONCE
Status: COMPLETED | OUTPATIENT
Start: 2025-07-30 | End: 2025-07-30

## 2025-07-30 RX ADMIN — SODIUM CHLORIDE 1000 ML: 0.9 INJECTION, SOLUTION INTRAVENOUS at 16:36

## 2025-07-30 RX ADMIN — SODIUM CHLORIDE, PRESERVATIVE FREE 10 ML: 5 INJECTION INTRAVENOUS at 17:24

## 2025-07-30 RX ADMIN — DICYCLOMINE HYDROCHLORIDE 10 MG: 10 CAPSULE ORAL at 16:44

## 2025-07-30 RX ADMIN — IOPAMIDOL 75 ML: 755 INJECTION, SOLUTION INTRAVENOUS at 17:24

## 2025-07-30 RX ADMIN — SODIUM CHLORIDE 80 ML: 9 INJECTION, SOLUTION INTRAVENOUS at 17:24

## 2025-07-30 ASSESSMENT — ENCOUNTER SYMPTOMS
VOMITING: 0
CHEST TIGHTNESS: 0
ABDOMINAL PAIN: 1
SHORTNESS OF BREATH: 0
NAUSEA: 0

## 2025-07-30 ASSESSMENT — PAIN SCALES - GENERAL
PAINLEVEL_OUTOF10: 0
PAINLEVEL_OUTOF10: 9
PAINLEVEL_OUTOF10: 3

## 2025-07-30 ASSESSMENT — PAIN DESCRIPTION - LOCATION: LOCATION: ABDOMEN

## 2025-07-30 ASSESSMENT — PAIN - FUNCTIONAL ASSESSMENT: PAIN_FUNCTIONAL_ASSESSMENT: 0-10

## 2025-07-30 ASSESSMENT — PAIN DESCRIPTION - DESCRIPTORS: DESCRIPTORS: ACHING;DISCOMFORT

## 2025-07-30 NOTE — ED PROVIDER NOTES
Asymptomatic bacteriuria.  CT no acute processes identified.  Patient reevaluated feeling well.  Would like to go home.  Will thus discharge patient as below        DATA USED IN MEDICAL DECISION MAKING FOR PATIENT INCLUDE:   Category 1: Labs ordered/reviewed, additional history results reviewed include:  Independently ordered and evaluated the all the labs noted below the disposition plan   7/9/2025 ultrasound abdomen evaluated no evidence of acute findings.  \"Postcholecystectomy findings.  Common bile duct 3 mm.      Category 2: Data Reviewed  I ordered, read, and independently interpreted the below labs/imaging:            The risks considered in the medical decision making for this patient included the following studies, labs, interventions, medications:   DATA FOR LAB AND RADIOLOGY TESTS ORDERED BELOW ARE REVIEWED BY THE ED CLINICIAN:    RADIOLOGY: All x-rays, CT, MRI, and formal ultrasound images (except ED bedside ultrasound) are read by the radiologist, see reports below, unless otherwise noted in MDM or here.  Reports below are reviewed by myself.  CT ABDOMEN PELVIS W IV CONTRAST Additional Contrast? None   Final Result   No CT evidence of acute process in the abdomen or pelvis.         XR CHEST 1 VIEW   Final Result   No acute cardiopulmonary process.             LABS: Lab orders shown below, the results are reviewed by myself, and all abnormals are listed below.  Labs Reviewed   CBC WITH AUTO DIFFERENTIAL - Abnormal; Notable for the following components:       Result Value    RDW 17.1 (*)     Monocytes % 13 (*)     Eosinophils % 6 (*)     All other components within normal limits   BASIC METABOLIC PANEL - Abnormal; Notable for the following components:    Potassium 3.5 (*)     BUN 27 (*)     Creatinine 1.2 (*)     Est, Glom Filt Rate 47 (*)     All other components within normal limits   TROPONIN - Abnormal; Notable for the following components:    Troponin, High Sensitivity 15 (*)     All other components  DO        This note was created with the assistance of a speech-recognition program. While intending to generate a document that actually reflects the content of the visit, no guarantees can be provided that every mistake has been identified and corrected by editing.       Yosi Caal,   08/01/25 0924